# Patient Record
Sex: FEMALE | Race: WHITE | Employment: UNEMPLOYED | URBAN - METROPOLITAN AREA
[De-identification: names, ages, dates, MRNs, and addresses within clinical notes are randomized per-mention and may not be internally consistent; named-entity substitution may affect disease eponyms.]

---

## 2017-01-27 ENCOUNTER — HOSPITAL ENCOUNTER (OUTPATIENT)
Dept: MRI IMAGING | Age: 22
Discharge: HOME | End: 2017-01-27
Attending: SPECIALIST
Payer: COMMERCIAL

## 2017-01-27 DIAGNOSIS — R10.9 ABDOMINAL PAIN: ICD-10-CM

## 2017-01-27 DIAGNOSIS — R11.0 NAUSEA: ICD-10-CM

## 2017-01-27 DIAGNOSIS — R94.5 NONSPECIFIC ABNORMAL RESULTS OF LIVER FUNCTION STUDY: ICD-10-CM

## 2017-01-27 PROCEDURE — 74181 MRI ABDOMEN W/O CONTRAST: CPT

## 2018-01-05 ENCOUNTER — HOSPITAL ENCOUNTER (EMERGENCY)
Age: 23
Discharge: HOME | End: 2018-01-05
Attending: EMERGENCY MEDICINE
Payer: COMMERCIAL

## 2018-01-05 ENCOUNTER — APPOINTMENT (OUTPATIENT)
Dept: ULTRASOUND IMAGING | Age: 23
End: 2018-01-05
Attending: EMERGENCY MEDICINE
Payer: COMMERCIAL

## 2018-01-05 ENCOUNTER — APPOINTMENT (OUTPATIENT)
Dept: GENERAL RADIOLOGY | Age: 23
End: 2018-01-05
Attending: EMERGENCY MEDICINE
Payer: COMMERCIAL

## 2018-01-05 VITALS
BODY MASS INDEX: 22.11 KG/M2 | SYSTOLIC BLOOD PRESSURE: 119 MMHG | HEIGHT: 65 IN | TEMPERATURE: 97.5 F | HEART RATE: 101 BPM | WEIGHT: 132.72 LBS | OXYGEN SATURATION: 99 % | RESPIRATION RATE: 21 BRPM | DIASTOLIC BLOOD PRESSURE: 71 MMHG

## 2018-01-05 DIAGNOSIS — R09.1 PLEURISY: ICD-10-CM

## 2018-01-05 DIAGNOSIS — F10.920 ACUTE ALCOHOLIC INTOXICATION WITHOUT COMPLICATION (HCC): Primary | ICD-10-CM

## 2018-01-05 DIAGNOSIS — R79.89 ELEVATED LFTS: ICD-10-CM

## 2018-01-05 DIAGNOSIS — F14.10 COCAINE ABUSE (HCC): ICD-10-CM

## 2018-01-05 LAB
ALBUMIN SERPL-MCNC: 4.3 G/DL (ref 3.5–5)
ALBUMIN/GLOB SERPL: 1.2 {RATIO} (ref 1.1–2.2)
ALP SERPL-CCNC: 110 U/L (ref 45–117)
ALT SERPL-CCNC: 44 U/L (ref 12–78)
AMMONIA PLAS-SCNC: 14 UMOL/L
AMPHET UR QL SCN: NEGATIVE
ANION GAP SERPL CALC-SCNC: 12 MMOL/L (ref 5–15)
APPEARANCE UR: CLEAR
AST SERPL-CCNC: 32 U/L (ref 15–37)
ATRIAL RATE: 133 BPM
BARBITURATES UR QL SCN: NEGATIVE
BASOPHILS # BLD: 0.1 K/UL (ref 0–0.1)
BASOPHILS NFR BLD: 1 % (ref 0–1)
BENZODIAZ UR QL: NEGATIVE
BILIRUB SERPL-MCNC: 0.2 MG/DL (ref 0.2–1)
BILIRUB UR QL: NEGATIVE
BUN SERPL-MCNC: 7 MG/DL (ref 6–20)
BUN/CREAT SERPL: 9 (ref 12–20)
CALCIUM SERPL-MCNC: 9 MG/DL (ref 8.5–10.1)
CALCULATED P AXIS, ECG09: 76 DEGREES
CALCULATED R AXIS, ECG10: 10 DEGREES
CALCULATED T AXIS, ECG11: 58 DEGREES
CANNABINOIDS UR QL SCN: NEGATIVE
CHLORIDE SERPL-SCNC: 106 MMOL/L (ref 97–108)
CK MB CFR SERPL CALC: NORMAL % (ref 0–2.5)
CK MB SERPL-MCNC: <1 NG/ML (ref 5–25)
CK SERPL-CCNC: 84 U/L (ref 26–192)
CO2 SERPL-SCNC: 25 MMOL/L (ref 21–32)
COCAINE UR QL SCN: POSITIVE
COLOR UR: NORMAL
CREAT SERPL-MCNC: 0.76 MG/DL (ref 0.55–1.02)
D DIMER PPP FEU-MCNC: 0.25 MG/L FEU (ref 0–0.65)
DIAGNOSIS, 93000: NORMAL
DRUG SCRN COMMENT,DRGCM: ABNORMAL
EOSINOPHIL # BLD: 0.1 K/UL (ref 0–0.4)
EOSINOPHIL NFR BLD: 1 % (ref 0–7)
ERYTHROCYTE [DISTWIDTH] IN BLOOD BY AUTOMATED COUNT: 13.3 % (ref 11.5–14.5)
ETHANOL SERPL-MCNC: 185 MG/DL
GLOBULIN SER CALC-MCNC: 3.7 G/DL (ref 2–4)
GLUCOSE SERPL-MCNC: 97 MG/DL (ref 65–100)
GLUCOSE UR STRIP.AUTO-MCNC: NEGATIVE MG/DL
HCG UR QL: NEGATIVE
HCT VFR BLD AUTO: 43 % (ref 35–47)
HGB BLD-MCNC: 14.8 G/DL (ref 11.5–16)
HGB UR QL STRIP: NEGATIVE
KETONES UR QL STRIP.AUTO: NEGATIVE MG/DL
LEUKOCYTE ESTERASE UR QL STRIP.AUTO: NEGATIVE
LIPASE SERPL-CCNC: 84 U/L (ref 73–393)
LYMPHOCYTES # BLD: 2.1 K/UL (ref 0.8–3.5)
LYMPHOCYTES NFR BLD: 28 % (ref 12–49)
MCH RBC QN AUTO: 30.5 PG (ref 26–34)
MCHC RBC AUTO-ENTMCNC: 34.4 G/DL (ref 30–36.5)
MCV RBC AUTO: 88.5 FL (ref 80–99)
METHADONE UR QL: NEGATIVE
MONOCYTES # BLD: 0.7 K/UL (ref 0–1)
MONOCYTES NFR BLD: 9 % (ref 5–13)
NEUTS SEG # BLD: 4.7 K/UL (ref 1.8–8)
NEUTS SEG NFR BLD: 61 % (ref 32–75)
NITRITE UR QL STRIP.AUTO: NEGATIVE
OPIATES UR QL: NEGATIVE
P-R INTERVAL, ECG05: 132 MS
PCP UR QL: NEGATIVE
PH UR STRIP: 6.5 [PH] (ref 5–8)
PLATELET # BLD AUTO: 269 K/UL (ref 150–400)
POTASSIUM SERPL-SCNC: 3.3 MMOL/L (ref 3.5–5.1)
PROT SERPL-MCNC: 8 G/DL (ref 6.4–8.2)
PROT UR STRIP-MCNC: NEGATIVE MG/DL
Q-T INTERVAL, ECG07: 316 MS
QRS DURATION, ECG06: 78 MS
QTC CALCULATION (BEZET), ECG08: 470 MS
RBC # BLD AUTO: 4.86 M/UL (ref 3.8–5.2)
SODIUM SERPL-SCNC: 143 MMOL/L (ref 136–145)
SP GR UR REFRACTOMETRY: <1.005 (ref 1–1.03)
TROPONIN I SERPL-MCNC: <0.04 NG/ML
UR CULT HOLD, URHOLD: NORMAL
UROBILINOGEN UR QL STRIP.AUTO: 0.2 EU/DL (ref 0.2–1)
VENTRICULAR RATE, ECG03: 133 BPM
WBC # BLD AUTO: 7.6 K/UL (ref 3.6–11)

## 2018-01-05 PROCEDURE — 80053 COMPREHEN METABOLIC PANEL: CPT | Performed by: EMERGENCY MEDICINE

## 2018-01-05 PROCEDURE — 83690 ASSAY OF LIPASE: CPT | Performed by: EMERGENCY MEDICINE

## 2018-01-05 PROCEDURE — 82550 ASSAY OF CK (CPK): CPT | Performed by: EMERGENCY MEDICINE

## 2018-01-05 PROCEDURE — 74011250636 HC RX REV CODE- 250/636: Performed by: EMERGENCY MEDICINE

## 2018-01-05 PROCEDURE — 71046 X-RAY EXAM CHEST 2 VIEWS: CPT

## 2018-01-05 PROCEDURE — 80307 DRUG TEST PRSMV CHEM ANLYZR: CPT | Performed by: EMERGENCY MEDICINE

## 2018-01-05 PROCEDURE — 82553 CREATINE MB FRACTION: CPT | Performed by: EMERGENCY MEDICINE

## 2018-01-05 PROCEDURE — 76700 US EXAM ABDOM COMPLETE: CPT

## 2018-01-05 PROCEDURE — 85025 COMPLETE CBC W/AUTO DIFF WBC: CPT | Performed by: EMERGENCY MEDICINE

## 2018-01-05 PROCEDURE — 99284 EMERGENCY DEPT VISIT MOD MDM: CPT

## 2018-01-05 PROCEDURE — 93005 ELECTROCARDIOGRAM TRACING: CPT

## 2018-01-05 PROCEDURE — 81003 URINALYSIS AUTO W/O SCOPE: CPT | Performed by: EMERGENCY MEDICINE

## 2018-01-05 PROCEDURE — 96361 HYDRATE IV INFUSION ADD-ON: CPT

## 2018-01-05 PROCEDURE — 94762 N-INVAS EAR/PLS OXIMTRY CONT: CPT

## 2018-01-05 PROCEDURE — 85379 FIBRIN DEGRADATION QUANT: CPT | Performed by: EMERGENCY MEDICINE

## 2018-01-05 PROCEDURE — 36415 COLL VENOUS BLD VENIPUNCTURE: CPT | Performed by: EMERGENCY MEDICINE

## 2018-01-05 PROCEDURE — 81025 URINE PREGNANCY TEST: CPT

## 2018-01-05 PROCEDURE — 96374 THER/PROPH/DIAG INJ IV PUSH: CPT

## 2018-01-05 PROCEDURE — 82140 ASSAY OF AMMONIA: CPT | Performed by: EMERGENCY MEDICINE

## 2018-01-05 PROCEDURE — 84484 ASSAY OF TROPONIN QUANT: CPT | Performed by: EMERGENCY MEDICINE

## 2018-01-05 RX ORDER — KETOROLAC TROMETHAMINE 30 MG/ML
30 INJECTION, SOLUTION INTRAMUSCULAR; INTRAVENOUS
Status: COMPLETED | OUTPATIENT
Start: 2018-01-05 | End: 2018-01-05

## 2018-01-05 RX ORDER — FAMOTIDINE 20 MG/1
20 TABLET, FILM COATED ORAL 2 TIMES DAILY
Qty: 30 TAB | Refills: 0 | Status: SHIPPED | OUTPATIENT
Start: 2018-01-05 | End: 2020-01-30

## 2018-01-05 RX ORDER — NAPROXEN 500 MG/1
500 TABLET ORAL 2 TIMES DAILY WITH MEALS
Qty: 20 TAB | Refills: 0 | Status: SHIPPED | OUTPATIENT
Start: 2018-01-05 | End: 2020-01-30

## 2018-01-05 RX ADMIN — KETOROLAC TROMETHAMINE 30 MG: 30 INJECTION, SOLUTION INTRAMUSCULAR at 03:39

## 2018-01-05 RX ADMIN — SODIUM CHLORIDE 1000 ML: 9 INJECTION, SOLUTION INTRAVENOUS at 03:40

## 2018-01-05 NOTE — Clinical Note
Please avoid any further alcohol beverages until seen and evaluated by the family doctor and have further testing to ascertain that the liver enzymes are back to normal level.

## 2018-01-05 NOTE — DISCHARGE INSTRUCTIONS
We hope that we have addressed all of your medical concerns. The examination and treatment you received in the Emergency Department were for an emergent problem and were not intended as complete care. It is important that you follow up with your healthcare provider(s) for ongoing care. If your symptoms worsen or do not improve as expected, and you are unable to reach your usual health care provider(s), you should return to the Emergency Department. Today's healthcare is undergoing tremendous change, and patient satisfaction surveys are one of the many tools to assess the quality of medical care. You may receive a survey from the [facility] regarding your experience in the Emergency Department. I hope that your experience has been completely positive, particularly the medical care that I provided. As such, please participate in the survey; anything less than excellent does not meet my expectations or intentions. [facility] and [address] [place] [address] participate in nationally recognized quality of care measures. If your blood pressure is greater than 120/80, as reported below, we urge that you seek medical care to address the potential of high blood pressure, commonly known as hypertension. Hypertension can be hereditary or can be caused by certain medical conditions, pain, stress, or \"white coat syndrome. \"       Please make an appointment with your health care provider(s) for follow up of your Emergency Department visit. VITALS:   Patient Vitals for the past 8 hrs:   Temp Pulse Resp BP SpO2   01/05/18 0345 - 94 17 110/49 96 %   01/05/18 0227 97.5 °F (36.4 °C) (!) 127 24 147/81 100 %          Thank you for allowing us to provide you with medical care today. We realize that you have many choices for your emergency care needs. Please choose us in the future for any continued health care needs. Paco Lowery, MD    [address] [facility].   Office: [phone]            Recent Results (from the past 24 hour(s))   CBC WITH AUTOMATED DIFF    Collection Time: 01/05/18  2:53 AM   Result Value Ref Range    WBC 7.6 3.6 - 11.0 K/uL    RBC 4.86 3.80 - 5.20 M/uL    HGB 14.8 11.5 - 16.0 g/dL    HCT 43.0 35.0 - 47.0 %    MCV 88.5 80.0 - 99.0 FL    MCH 30.5 26.0 - 34.0 PG    MCHC 34.4 30.0 - 36.5 g/dL    RDW 13.3 11.5 - 14.5 %    PLATELET 338 565 - 938 K/uL    NEUTROPHILS 61 32 - 75 %    LYMPHOCYTES 28 12 - 49 %    MONOCYTES 9 5 - 13 %    EOSINOPHILS 1 0 - 7 %    BASOPHILS 1 0 - 1 %    ABS. NEUTROPHILS 4.7 1.8 - 8.0 K/UL    ABS. LYMPHOCYTES 2.1 0.8 - 3.5 K/UL    ABS. MONOCYTES 0.7 0.0 - 1.0 K/UL    ABS. EOSINOPHILS 0.1 0.0 - 0.4 K/UL    ABS. BASOPHILS 0.1 0.0 - 0.1 K/UL   METABOLIC PANEL, COMPREHENSIVE    Collection Time: 01/05/18  2:53 AM   Result Value Ref Range    Sodium 143 136 - 145 mmol/L    Potassium 3.3 (L) 3.5 - 5.1 mmol/L    Chloride 106 97 - 108 mmol/L    CO2 25 21 - 32 mmol/L    Anion gap 12 5 - 15 mmol/L    Glucose 97 65 - 100 mg/dL    BUN 7 6 - 20 MG/DL    Creatinine 0.76 0.55 - 1.02 MG/DL    BUN/Creatinine ratio 9 (L) 12 - 20      GFR est AA >60 >60 ml/min/1.73m2    GFR est non-AA >60 >60 ml/min/1.73m2    Calcium 9.0 8.5 - 10.1 MG/DL    Bilirubin, total 0.2 0.2 - 1.0 MG/DL    ALT (SGPT) 44 12 - 78 U/L    AST (SGOT) 32 15 - 37 U/L    Alk.  phosphatase 110 45 - 117 U/L    Protein, total 8.0 6.4 - 8.2 g/dL    Albumin 4.3 3.5 - 5.0 g/dL    Globulin 3.7 2.0 - 4.0 g/dL    A-G Ratio 1.2 1.1 - 2.2     CK W/ CKMB & INDEX    Collection Time: 01/05/18  2:53 AM   Result Value Ref Range    CK 84 26 - 192 U/L    CK - MB <1.0 <3.6 NG/ML    CK-MB Index Cannot be calculated 0 - 2.5     TROPONIN I    Collection Time: 01/05/18  2:53 AM   Result Value Ref Range    Troponin-I, Qt. <0.04 <0.05 ng/mL   D DIMER    Collection Time: 01/05/18  2:53 AM   Result Value Ref Range    D-dimer 0.25 0.00 - 0.65 mg/L FEU   LIPASE Collection Time: 01/05/18  2:53 AM   Result Value Ref Range    Lipase 84 73 - 393 U/L   ETHYL ALCOHOL    Collection Time: 01/05/18  2:54 AM   Result Value Ref Range    ALCOHOL(ETHYL),SERUM 185 (H) <10 MG/DL   AMMONIA    Collection Time: 01/05/18  3:03 AM   Result Value Ref Range    Ammonia 14 <32 UMOL/L   URINALYSIS W/ RFLX MICROSCOPIC    Collection Time: 01/05/18  3:12 AM   Result Value Ref Range    Color YELLOW/STRAW      Appearance CLEAR CLEAR      Specific gravity <1.005 1.003 - 1.030    pH (UA) 6.5 5.0 - 8.0      Protein NEGATIVE  NEG mg/dL    Glucose NEGATIVE  NEG mg/dL    Ketone NEGATIVE  NEG mg/dL    Bilirubin NEGATIVE  NEG      Blood NEGATIVE  NEG      Urobilinogen 0.2 0.2 - 1.0 EU/dL    Nitrites NEGATIVE  NEG      Leukocyte Esterase NEGATIVE  NEG     DRUG SCREEN, URINE    Collection Time: 01/05/18  3:12 AM   Result Value Ref Range    AMPHETAMINES NEGATIVE  NEG      BARBITURATES NEGATIVE  NEG      BENZODIAZEPINES NEGATIVE  NEG      COCAINE POSITIVE (A) NEG      METHADONE NEGATIVE  NEG      OPIATES NEGATIVE  NEG      PCP(PHENCYCLIDINE) NEGATIVE  NEG      THC (TH-CANNABINOL) NEGATIVE  NEG      Drug screen comment (NOTE)    URINE CULTURE HOLD SAMPLE    Collection Time: 01/05/18  3:12 AM   Result Value Ref Range    Urine culture hold URINE ON HOLD IN MICROBIOLOGY DEPT FOR 3 DAYS     HCG URINE, QL. - POC    Collection Time: 01/05/18  3:14 AM   Result Value Ref Range    Pregnancy test,urine (POC) NEGATIVE  NEG         Xr Chest Pa Lat    Result Date: 1/5/2018  INDICATION: Chest Pain EXAM: CXR 2 View FINDINGS: Frontal and lateral views of the chest show clear lungs. Heart size is normal. There is no pulmonary edema. There is no evident pneumothorax, adenopathy or effusion. IMPRESSION: Normal two view chest x-ray. Us Abd Comp    Result Date: 1/5/2018  EXAM:  US ABD COMP INDICATION: Epigastric abdominal pain.  TECHNIQUE: Real-time sonography of the abdomen was performed with multiple static images of the liver, gallbladder, pancreas, spleen, kidneys and retroperitoneum obtained. FINDINGS: LIVER: The liver is normal in echotexture with no mass or other focal abnormality. LIVER VASCULATURE: The portal vein flow is antegrade. GALLBLADDER: The gallbladder is normal and no stones are identified. There is no wall thickening or fluid around the gallbladder. COMMON BILE DUCT: There is no biliary duct dilatation and the common duct measures 3 mm in diameter. PANCREAS: The visualized pancreas is normal. SPLEEN: The spleen is normal in echotexture and size and measures 10.7 cm in length. RIGHT KIDNEY: The right kidney demonstrates normal echogenicity with no mass, stone or hydronephrosis. The right kidney measures 10.2 cm in length. LEFT KIDNEY: The left kidney demonstrates normal echogenicity with no mass, stone or hydronephrosis. The left kidney measures 10.1 cm in length. RETROPERITONEUM: The aorta tapers normally. The IVC is normal. There is no ascites. IMPRESSION: Normal abdominal ultrasound examination. Pleurisy: Care Instructions  Your Care Instructions  Pleurisy is inflammation of the tissue that lines the inside of the chest and covers the lungs (pleura). Pleurisy is often caused by an infection, usually a virus. It also can be caused by other health problems, such as pneumonia or lupus. Pleurisy can cause sharp chest pain that gets worse when you cough or take a deep breath. You may need more tests to find out what is causing your pleurisy. Treatment depends on the cause. Pleurisy may come and go for a few days, or it may continue if the cause has not been treated. Home treatment can help ease symptoms. Follow-up care is a key part of your treatment and safety. Be sure to make and go to all appointments, and call your doctor if you are having problems. It's also a good idea to know your test results and keep a list of the medicines you take. How can you care for yourself at home?   · Take an over-the-counter pain medicine, such as acetaminophen (Tylenol), ibuprofen (Advil, Motrin), or naproxen (Aleve). Read and follow all instructions on the label. · Do not take two or more pain medicines at the same time unless the doctor told you to. Many pain medicines have acetaminophen, which is Tylenol. Too much acetaminophen (Tylenol) can be harmful. · If your doctor prescribed antibiotics, take them as directed. Do not stop taking them just because you feel better. You need to take the full course of antibiotics. · Take cough medicine as directed if your doctor recommends it. · Avoid activities that make the pain worse. When should you call for help? Call 911 anytime you think you may need emergency care. For example, call if:  ? · You have severe trouble breathing. ? · You have severe chest pain. ? · You passed out (lost consciousness). ?Call your doctor now or seek immediate medical care if:  ? · You have a new or higher fever. ? Watch closely for changes in your health, and be sure to contact your doctor if:  ? · You begin to cough up yellow or green mucus. ? · You cough up blood. ? · Your symptoms are not better in 3 or 4 days. Where can you learn more? Go to http://ghada-farrukh.info/. Enter F346 in the search box to learn more about \"Pleurisy: Care Instructions. \"  Current as of: May 12, 2017  Content Version: 11.4  © 2931-0108 [employer]. Care instructions adapted under license by [employer] (which disclaims liability or warranty for this information). If you have questions about a medical condition or this instruction, always ask your healthcare professional. [place] [address] any warranty or liability for your use of this information. Alcohol, Drug, or Poison Ingestion: Care Instructions  Your Care Instructions    A person can become very sick, or die, from swallowing or using alcohol, drugs, or poisons.   Alcohol poisoning occurs when a person drinks a large amount of alcohol. Alcohol can stop nerve signals that control breathing. It can also stop the gag reflex that prevents choking. Alcohol poisoning is serious. It can lead to brain damage or death if it's not treated right away. Drugs can be used by accident or on purpose. They can be swallowed, inhaled, injected, or absorbed through the skin. Drugs include over-the-counter medicine (such as aspirin or acetaminophen) and prescription medicine. They also include vitamins and supplements. And they include illegal drugs such as cocaine and heroin. And poisons are all around us. They include household , cosmetics, houseplants, and garden chemicals. The doctor has checked you carefully, but problems can develop later. If you notice any problems or new symptoms, get medical treatment right away. Follow-up care is a key part of your treatment and safety. Be sure to make and go to all appointments, and call your doctor if you are having problems. It's also a good idea to know your test results and keep a list of the medicines you take. How can you care for yourself at home? Alcohol problems  · Talk to your doctor or counselor about programs that can help you stop using alcohol. · Plan ways to avoid being tempted to drink. ¨ Get rid of all alcohol in your home. ¨ Avoid places where you tend to drink. ¨ Stay away from places or events that offer alcohol. ¨ Stay away from people who drink a lot. Drug problems  · Talk to your doctor about programs that can help you stop using drugs. · Get rid of any drugs you might be tempted to misuse. · Learn how to say no when other people use drugs. · Don't spend time with people who use drugs. Poison prevention  · Keep products in the containers they came in. Keep them with the original labels. · Be careful when you use cleaning products, paints, solvents, and pesticides. Read labels before use.  Use a fan to move strong odors and fumes out of your home. · Do not mix cleaning products. Try to use nontoxic . These include vinegar, lemon juice, and baking soda. When should you call for help? Poison control centers, hospitals, or your doctor can give immediate advice in the case of a poisoning. The [employer] number is [phone]. Have the poison container with you so you can give complete information to the poison control center, such as what the poison or substance is, how much was taken and when. Do not try to make the person vomit. ?Call 911 anytime you think you may need emergency care. For example, call if you or someone else:  ? · Has used or currently uses alcohol or drugs and is very confused or can't stay awake. ? · Has passed out (lost consciousness). ? · Has severe trouble breathing. ? · Is having a seizure. ?Call your doctor now or seek immediate medical care if you or someone else:  ? · Has new symptoms, or is not acting normally. ? Watch closely for changes in your health, and be sure to contact your doctor if:  ? · You do not get better as expected. ? · You need help with drug or alcohol problems. ? · You have problems with depression or other mental health issues. Where can you learn more? Go to http://ghada-farrukh.info/. Enter V370 in the search box to learn more about \"Alcohol, Drug, or Poison Ingestion: Care Instructions. \"  Current as of: March 20, 2017  Content Version: 11.4  © 3814-5629 [employer]. Care instructions adapted under license by [employer] (which disclaims liability or warranty for this information). If you have questions about a medical condition or this instruction, always ask your healthcare professional. [address] any warranty or liability for your use of this information.

## 2018-01-05 NOTE — ED TRIAGE NOTES
Patient with chest/rib pain and having a hard time taking a deep breath that started about 30 minutes ago. States has been drinking tonight. Was told by her physician that her liver enzymes were elevated, that she was in kidney failure and that she has mono.

## 2018-01-05 NOTE — ED PROVIDER NOTES
HPI Comments: 25 y.o. female with past medical history significant for anemia, scoliosis, s/p cholecystectomy, who presents ambulatory to the ED with chief complaint of CP/epigastric abdominal pain. Pt reports that her pain started ~30 minutes ago while at the bar. The pain is \"sharp\" in quality and exacerbated with deep inspiration. It starts in the center of the chest and radiates into the epigastrium and around the ribcage. She also c/o pain in the mid to lower back. There was no injury or trauma associated with the onset of her discomfort, and she denies any h/o similar symptoms. She additionally c/o some nausea at this time. Of note, pt states that she had blood work performed at her PCP's office on 12/22/2017 because she has had a recent cough and has been fatigued/sleeping more. She reports that her PCP's office called her tonight and told her that her LFTs were elevated, and also told her that she tested positive for mono. Admits that she was drinking at the bar tonight, and reports ingestion of three alcoholic beverages (two beers and one shooter). Pt specifically denies any congestion, fevers, HA, diarrhea, constipation, and vomiting. Notes her LMP was 2 weeks ago and normal. Her boyfriend drove her to the ED. There are no other acute medical concerns at this time. Social hx: Negative for Tobacco use (patient is a former smoker); Positive for EtOH use (occasional); Negative for Illicit Drug Abuse    PCP: Юлия Munoz MD    Note written by Delphine Mcdaniels. Marguerite Benz, as dictated by Deann Granda MD 2:40 AM     The history is provided by the patient. The history is limited by the condition of the patient (alcohol intoxication). No  was used.         Past Medical History:   Diagnosis Date    Anemia     Pyelonephritis, unspecified 6/24/2012    Routine Papanicolaou smear 9/1/16 neg    Scoliosis     scoliosis       Past Surgical History:   Procedure Laterality Date    HX CHOLECYSTECTOMY  04/2016    HX OTHER SURGICAL  08/2016    Ureteroscopy         Family History:   Problem Relation Age of Onset    Asthma Mother     Diabetes Father        Social History     Social History    Marital status: SINGLE     Spouse name: N/A    Number of children: N/A    Years of education: N/A     Occupational History    Not on file. Social History Main Topics    Smoking status: Former Smoker    Smokeless tobacco: Never Used    Alcohol use No    Drug use: No    Sexual activity: Yes     Partners: Male     Birth control/ protection: Pill     Other Topics Concern    Not on file     Social History Narrative         ALLERGIES: Review of patient's allergies indicates no known allergies. Review of Systems   Constitutional: Positive for fatigue. Negative for fever. HENT: Negative for congestion. Respiratory: Positive for cough. Cardiovascular: Positive for chest pain. Gastrointestinal: Positive for abdominal pain and nausea. Negative for constipation, diarrhea and vomiting. Genitourinary: Negative for menstrual problem. Musculoskeletal: Positive for back pain. Positive for rib pain   Neurological: Negative for headaches. All other systems reviewed and are negative. Vitals:    01/05/18 0227   BP: 147/81   Pulse: (!) 127   Resp: 24   Temp: 97.5 °F (36.4 °C)   SpO2: 100%   Weight: 60.2 kg (132 lb 11.5 oz)   Height: 5' 5\" (1.651 m)            Physical Exam   Nursing note and vitals reviewed. CONSTITUTIONAL: Well-appearing; well-nourished; in mild distress  HEAD: Normocephalic; atraumatic  EYES: PERRL; EOM intact; conjunctiva and sclera are clear bilaterally. ENT: No rhinorrhea; normal pharynx with no tonsillar hypertrophy; mucous membranes pink/moist, no erythema, no exudate. NECK: Supple; non-tender; no cervical lymphadenopathy  CARD: Normal S1, S2; no murmurs, rubs, or gallops. Regular rate and rhythm.   RESP: Normal respiratory effort; breath sounds clear and equal bilaterally; no wheezes, rhonchi, or rales. ABD: Normal bowel sounds; non-distended; epigastric tenderness; no palpable organomegaly, no masses, no bruits. Back Exam: Normal inspection; no vertebral point tenderness, no CVA tenderness. Normal range of motion. EXT: Normal ROM in all four extremities; non-tender to palpation; no swelling or deformity; distal pulses are normal, no edema. SKIN: Warm; dry; no rash. NEURO:Alert and oriented x 3, coherent, MIREILLE-XII grossly intact, sensory and motor are non-focal.        MDM  Number of Diagnoses or Management Options  Diagnosis management comments: Assessment: this is a 72-year-old female with history ofrecent diagnosis of mono presents the ED with a complaint of midepigastric, and pleuritic chest pain that began this evening prior to arrival to the ED. The patient is in moderate distress and intoxicated, but hemodynamically stable. Differential diagnosis include pleurisy/gastritis/pancreatitis/biliary colic/ ACS/ VTE/     Plan: EKG/ lab/ IV fluid/ antiemetics and analgesia/ chest x-ray/ ultrasound. Abdomen/ serial exam/ Monitor and Reevaluate.          Amount and/or Complexity of Data Reviewed  Clinical lab tests: ordered and reviewed  Tests in the radiology section of CPT®: ordered and reviewed  Tests in the medicine section of CPT®: ordered and reviewed  Discussion of test results with the performing providers: yes  Decide to obtain previous medical records or to obtain history from someone other than the patient: yes  Obtain history from someone other than the patient: yes  Review and summarize past medical records: yes  Discuss the patient with other providers: yes  Independent visualization of images, tracings, or specimens: yes    Risk of Complications, Morbidity, and/or Mortality  Presenting problems: moderate  Diagnostic procedures: moderate  Management options: moderate    Patient Progress  Patient progress: stable    ED Course       Procedures     ED EKG interpretation:  Rhythm: sinus tachycardia; and regular . Rate (approx.): 133; Axis: normal; P wave: normal; QRS interval: normal ; ST/T wave: non-specific changes; in  Lead: diffusely; PVCs; Other findings: abnormal ekg. This EKG was interpreted by Radha Florence MD,ED Provider. XRAY INTERPRETATION (ED MD)  Chest Xray  No acute process seen. Normal heart size. No bony abnormalities. No infiltrate. Radha Florence MD 3:44 AM    Progress Note:   Pt has been reexamined by Radha Florence MD. Pt is feeling much better. Symptoms have improved. She is moderately intoxicated, but denies any further symptoms All available results have been reviewed with pt and any available family. Pt understands sx, dx, and tx in ED. Care plan has been outlined and questions have been answered. Pt is ready to go home. Will send home on pleurisy/GERD/alcohol and cocaine abuse instructions. Prescription Naprosyn and Pepcid. Outpatient referral with PCP/ GI as needed. Written by Radha Florence MD,5:16 AM    .   .

## 2018-01-16 ENCOUNTER — OFFICE VISIT (OUTPATIENT)
Dept: OBGYN CLINIC | Age: 23
End: 2018-01-16

## 2018-01-16 VITALS
BODY MASS INDEX: 21.99 KG/M2 | WEIGHT: 132 LBS | DIASTOLIC BLOOD PRESSURE: 81 MMHG | SYSTOLIC BLOOD PRESSURE: 119 MMHG | HEIGHT: 65 IN

## 2018-01-16 DIAGNOSIS — O20.0 THREATENED MISCARRIAGE: Primary | ICD-10-CM

## 2018-01-16 NOTE — PROGRESS NOTES
Threatened AB note    Lianna Harris is a ,  25 y.o. female [facility] No LMP recorded (lmp unknown). Patient is pregnant. making her possible around 7 weeks pregnant. She has not had prenatal care. Was on OCP til December. She presents with spotting that occurred off and on for the month of December . The amount of bleeding is described as light and not lasting long. She had a positive pregnancy test a few days ago. She has not had a recent pelvic ultrasound. Her past medical history is not significant for risk factors for ectopic pregnancy. She has not had pelvic pain. The patient specifically denies right or left pelvic pain. She does not have a history of a spontaneous . She has not had a recent injury or trauma. Additional complaints: none. Past Medical History:   Diagnosis Date    Anemia     Pyelonephritis, unspecified 2012    Routine Papanicolaou smear 16 neg    Scoliosis     scoliosis     Past Surgical History:   Procedure Laterality Date    HX CHOLECYSTECTOMY  2016   Arthea Lower OTHER SURGICAL  2016    Ureteroscopy     Social History     Occupational History    Not on file. Social History Main Topics    Smoking status: Former Smoker    Smokeless tobacco: Never Used    Alcohol use No    Drug use: No    Sexual activity: Yes     Partners: Male     Birth control/ protection: Pill     Family History   Problem Relation Age of Onset    Asthma Mother     Diabetes Father        No Known Allergies  Prior to Admission medications    Medication Sig Start Date End Date Taking? Authorizing Provider   naproxen (NAPROSYN) 500 mg tablet Take 1 Tab by mouth two (2) times daily (with meals).  18   Salomon Preciado MD   famotidine (PEPCID) 20 mg tablet Take 1 Tab by mouth two (2) times a day. 1/5/18   Radames Gonzalez MD   norgestimate-ethinyl estradiol ([id number],) 0.25-35 mg-mcg tab Take 1 Tab by mouth daily.  9/1/16   Memo Ramirez MD        Review of Systems: History obtained from the patient  Constitutional: negative for weight loss, fever, night sweats  Breast: negative for breast lumps, nipple discharge, galactorrhea  GI: negative for change in bowel habits, abdominal pain, black or bloody stools  : negative for frequency, dysuria, hematuria, vaginal discharge  MSK: negative for back pain, joint pain, muscle pain  Skin: negative for itching, rash, hives  Psych: negative for anxiety, depression, change in mood      Objective:  Visit Vitals    /81    Ht 5' 5\" (1.651 m)    Wt 132 lb (59.9 kg)    LMP  (LMP Unknown)    BMI 21.97 kg/m2       Physical Exam:   PHYSICAL EXAMINATION    Constitutional  · Appearance: well-nourished, well developed, alert, in no acute distress    Gastrointestinal  · Abdominal Examination: abdomen non-tender to palpation, normal bowel sounds, no masses present  · Liver and spleen: no hepatomegaly present, spleen not palpable  · Hernias: no hernias identified    Genitourinary  · External Genitalia: normal appearance for age, no discharge present, no tenderness present, no inflammatory lesions present, no masses present, no atrophy present  · Vagina: normal vaginal vault without central or paravaginal defects, bloody discharge present, no inflammatory lesions present, no masses present  · Bladder: non-tender to palpation  · Urethra: appears normal  · Cervix: normal   · Uterus: retroflexed, not enlarged, firm, not tender with normal shape and consistency  · Adnexa: no adnexal tenderness present, no adnexal masses present  · Perineum: perineum within normal limits, no evidence of trauma, no rashes or skin lesions present  · Anus: anus within normal limits, no hemorrhoids present  · Inguinal Lymph Nodes: no lymphadenopathy present    Skin  · General Inspection: no rash, no lesions identified    Neurologic/Psychiatric  · Mental Status:  · Orientation: grossly oriented to person, place and time  · Mood and Affect: mood normal, affect appropriate    Assessment:   Threatened AB vs Chemical pregnancy  US--normal non pregnant uterus    Plan:     Quantitative HCG today. Repeat prn  Transvaginal First Trimester Ultrasound Procedure    Michelle Max is a ,  25 y.o. female [facility] No LMP recorded (lmp unknown). This makes her currently 7 weeks and 0 days of gestation by dates. She is here today for early pregnancy ultrasound for suspected non-viability. Ultrasound results: A non-pregnant appearing uterus is seen. The normal endometrium is seen. The uterus is normal size.    Subchorionic hemorrhage was not seen  Right Ovary - NORMAL   Left Ovary - NORMAL but with multiple 3 mm cysts  Comment: no evidence of pregnancy

## 2018-01-17 LAB — HCG INTACT+B SERPL-ACNC: 15 MIU/ML

## 2018-01-18 ENCOUNTER — TELEPHONE (OUTPATIENT)
Dept: OBGYN CLINIC | Age: 23
End: 2018-01-18

## 2018-01-18 NOTE — TELEPHONE ENCOUNTER
Patient was advised of results from Beta HCG on 1/16/18 and had a very expressive reaction. As soon as I advised that the HCG nearly back to normal, her voice was raised loudly and would not allow me to speak. I advised that I had more advice to read. She then stopped and allowed me to continue. She is extremely upset and wants to speak with Dr. Watson Page. She said that she had a very positive pregnancy test today and two yesterday. Insists on message to be given to Dr. Watson Page to call her today.     Thanks

## 2018-04-11 ENCOUNTER — TELEPHONE (OUTPATIENT)
Dept: OBGYN CLINIC | Age: 23
End: 2018-04-11

## 2018-04-11 RX ORDER — NORGESTIMATE AND ETHINYL ESTRADIOL 0.25-0.035
KIT ORAL
Qty: 3 DOSE PACK | Refills: 0 | Status: SHIPPED | OUTPATIENT
Start: 2018-04-11 | End: 2018-09-08 | Stop reason: SDUPTHER

## 2018-04-11 NOTE — TELEPHONE ENCOUNTER
Message left at 3:15PM      25year old patient last seen in the office on 9/1/16. Patient left a message to say that she needs her birth control to be sent in 3 month supplies due to insurance. This nurse called the patient and advised that she is in need of AE. Patient stated \" I have been seen in Jan\". This nurse advised per Jennifer Hagan that patient needs AE and then prescription can be sent for 3 month supply. Patient placed on the schedule to be seen at 4/24/18 at 11:10am.    Prescription resent as per MD order for 3 months to patient preferred pharmacy. Patient verbalized understanding.

## 2019-09-12 ENCOUNTER — TELEPHONE (OUTPATIENT)
Dept: OBGYN CLINIC | Age: 24
End: 2019-09-12

## 2019-09-12 NOTE — TELEPHONE ENCOUNTER
Patient of [address] [place] [address] Dr Hoover. She is coming in 9/26/19 for EOB. She is approximately 6 weeks gestation. She started with slight pink vaginal bleeding randomly with wiping yesterday and it gradually darkened to a dark red. Today she is wearing a tampon and it has some dark red blood again. Some slight right sided abdominal pain but very minor. Good water intake. No recent intercourse. She has been sick with possible pneumonia and took inhaler, antibiotic, cough suppressant and oral steroid for the past 3 weeks. Please advise. The patient wants to be seen today. She said in the past she had a chemical pregnancy and is VERY anxious.

## 2019-09-12 NOTE — TELEPHONE ENCOUNTER
11:30 tomorrow with HW per [facility]- discussed with patient bleeding and pain precaution.

## 2019-09-13 ENCOUNTER — OFFICE VISIT (OUTPATIENT)
Dept: OBGYN CLINIC | Age: 24
End: 2019-09-13

## 2019-09-13 VITALS
HEIGHT: 65 IN | BODY MASS INDEX: 28.09 KG/M2 | SYSTOLIC BLOOD PRESSURE: 122 MMHG | DIASTOLIC BLOOD PRESSURE: 80 MMHG | WEIGHT: 168.6 LBS

## 2019-09-13 DIAGNOSIS — O20.0 THREATENED MISCARRIAGE: Primary | ICD-10-CM

## 2019-09-13 NOTE — PROGRESS NOTES
Threatened AB note    Arlene Bo is a ,  25 y.o. female [facility] No LMP recorded (lmp unknown). She states it was the end of July- making her about 6 weeks pregnant. She has not had prenatal care. She presents with spotting and cramping that started 1 day ago. The amount of bleeding is described as light. The cramps are described as minimal. She has not passed tissue. She had a positive pregnancy test on Tuesday. She has not had a recent pelvic ultrasound. Her past medical history is not significant for risk factors for ectopic pregnancy. She has not had pelvic pain. The patient specifically denies right or left pelvic pain. She does not have a history of a spontaneous  but has a history of a \"chemical\" pregnancy. She has not had a recent injury or trauma. Additional complaints: none. Past Medical History:   Diagnosis Date    Anemia     Pyelonephritis, unspecified 2012    Routine Papanicolaou smear 16 neg    Scoliosis     scoliosis     Past Surgical History:   Procedure Laterality Date    HX CHOLECYSTECTOMY  2016    HX OTHER SURGICAL  2016    Ureteroscopy     Social History     Occupational History    Not on file   Tobacco Use    Smoking status: Former Smoker    Smokeless tobacco: Never Used   Substance and Sexual Activity    Alcohol use: No    Drug use: No    Sexual activity: Yes     Partners: Male     Birth control/protection: None     Family History   Problem Relation Age of Onset    Asthma Mother     Diabetes Father        No Known Allergies  Prior to Admission medications    Medication Sig Start Date End Date Taking?  Authorizing Provider   [facility], , 0.25-35 mg-mcg tab TAKE 1 TABLET BY MOUTH ONE TIME DAILY 9/10/18   Luisa Duenas MD   [address] [facility], , 0.25-35 mg-mcg tab TAKE 1 TABLET BY MOUTH ONE TIME DAILY 9/8/18   Luisa Duenas MD   naproxen (NAPROSYN) 500 mg tablet Take 1 Tab by mouth two (2) times daily (with meals). 1/5/18   Lolis Marin MD   famotidine (PEPCID) 20 mg tablet Take 1 Tab by mouth two (2) times a day. 1/5/18   Lolis Marin MD        Review of Systems: History obtained from the patient  Constitutional: negative for weight loss, fever, night sweats  Breast: negative for breast lumps, nipple discharge, galactorrhea  GI: negative for change in bowel habits, abdominal pain, black or bloody stools  : negative for frequency, dysuria, hematuria, vaginal discharge  MSK: negative for back pain, joint pain, muscle pain  Skin: negative for itching, rash, hives  Psych: negative for anxiety, depression, change in mood      Objective:  Visit Vitals  /80   Ht 5' 5\" (1.651 m)   Wt 168 lb 9.6 oz (76.5 kg)   LMP  (LMP Unknown)   Breastfeeding?  No   BMI 28.06 kg/m²       Physical Exam:   PHYSICAL EXAMINATION    Constitutional  · Appearance: well-nourished, well developed, alert, in no acute distress    Gastrointestinal  · Abdominal Examination: abdomen non-tender to palpation, normal bowel sounds, no masses present  · Liver and spleen: no hepatomegaly present, spleen not palpable  · Hernias: no hernias identified    Genitourinary  · External Genitalia: normal appearance for age, no discharge present, no tenderness present, no inflammatory lesions present, no masses present, no atrophy present  · Vagina: normal vaginal vault without central or paravaginal defects, bloody discharge present, no inflammatory lesions present, no masses present  · Bladder: non-tender to palpation  · Urethra: appears normal  · Cervix: normal   · Uterus: not enlarged, not soft, not tender with normal shape and consistency--retroflexed  · Adnexa: no adnexal tenderness present, no adnexal masses present  · Perineum: perineum within normal limits, no evidence of trauma, no rashes or skin lesions present  · Anus: anus within normal limits, no hemorrhoids present  · Inguinal Lymph Nodes: no lymphadenopathy present    Skin  · General Inspection: no rash, no lesions identified    Neurologic/Psychiatric  · Mental Status:  · Orientation: grossly oriented to person, place and time  · Mood and Affect: mood normal, affect appropriate    Assessment:   US c/w threatened AB  Suspect chemical preg    Plan:   Quantitative HCG's  RTO: Monday for repeat    Transvaginal First Trimester Ultrasound Procedure    Brett Ellis is a G2 ,  25 y.o. female [facility]  No LMP recorded (lmp unknown). This makes her currently 6 weeks and 0 days of gestation by dates. She is here today for early pregnancy ultrasound for suspected non-viability. Ultrasound results: no intrauterine pregnancy is visible. The fetus is not seen. The yolk sac is not seen. The crown rump length of the fetus is not measurable. Subchorionic hemorrhage was not seen  Right Ovary - NORMAL   Left Ovary - CL cyst  Comment: non-viable IUP is suspected. Endometrium is somewhat thickened at 8 mm.   Small amount of fluid in CDS

## 2019-09-14 LAB — HCG INTACT+B SERPL-ACNC: 175 MIU/ML

## 2019-09-16 ENCOUNTER — LAB ONLY (OUTPATIENT)
Dept: OBGYN CLINIC | Age: 24
End: 2019-09-16

## 2019-09-16 DIAGNOSIS — O20.0 THREATENED MISCARRIAGE: Primary | ICD-10-CM

## 2019-09-17 LAB — HCG INTACT+B SERPL-ACNC: 28 MIU/ML

## 2019-11-21 NOTE — PROGRESS NOTES
Current pregnancy history:    Aminah Torres is a 25 y.o. female who presents for the evaluation of pregnancy. Patient's last menstrual period was 10/01/2019 (exact date). LMP history:  The date of her LMP is  certain. Her last menstrual period was normal and lasted for 4 to 5 days. A urine pregnancy test was positive 3 1/2 weeks weeks ago. She was not on the pill at conception. Based on her LMP, her EDC is 20 and her EGA is 7 weeks, 6 days. Her menstrual cycles are regular and occur approximately every 28 days  and range from 3 to 5 days. The last menses lasted  the usual number of days. Ultrasound data:  She had an  ultrasound done by the physician today which revealed a viable funez pregnancy with a gestational age of 7 weeks and 6 days giving an [address] of 20. Pregnancy symptoms:    Since her LMP she has experienced  urinary frequency, breast tenderness, and nausea. She has not been vomiting over the last few weeks. Associated signs and symptoms which she denies: dysuria, discharge, vaginal bleeding. She states she has gained weight:  Approximately a few pounds over the last few weeks. Relevant past pregnancy history:   She has the following pregnancy history: SAB Her last pregnancy was uncomplicated. She has no history of  delivery. Relevant past medical history:(relevant to this pregnancy): noncontributory. Pap/Occupational history:  Last pap smear: 3 years ago Results: Normal      Her occupation is: Employed. Substance history: negative for alcohol, tobacco and street drugs. Positive for nothing. Exposure history: There is/are no indoor cat/s in the home. The patient was instructed to not change the cat litter. She admits close contact with children on a regular basis. She has had chicken pox or the vaccine in the past.   Patient denies issues with domestic violence.      Genetic Screening/Teratology Counseling: (Includes patient, baby's father, or anyone in either family with:)  1.  Patient's age >/= 28 [facility]?-- no  .   2. Thalassemia ([facility] [place], [address], or  background): MCV<80?--no.     3.  Neural tube defect (meningomyelocele, spina bifida, anencephaly)?--no.   4.  Congenital heart defect?--no.  5.  Down syndrome?--no.   6.  Luis-Sachs (Anglican, Western [place])?--no.   7.  Canavan's Disease?--no.   8.  Familial Dysautonomia?--no.   9.  Sickle cell disease or trait ()? --no   The patient has not been tested for sickle trait  10. Hemophilia or other blood disorders?--no. 11.  Muscular dystrophy?--no. 12.  Cystic fibrosis?--no. 13.  [place]'s Chorea?--no. 14.  Mental retardation/autism (if yes was person tested for Fragile X)?--no. 15.  Other inherited genetic or chromosomal disorder?--no. 12.  Maternal metabolic disorder (DM, PKU, etc)?--no. 17.  Patient or FOB with a child with a birth defect not listed above?--no.  17a. Patient or FOB with a birth defect themselves?--no. 18.  Recurrent pregnancy loss, or stillbirth?--no. 19.  Any medications since LMP other than prenatal vitamins (include vitamins, supplements, OTC meds, drugs, alcohol)?--no. 20.  Any other genetic/environmental exposure to discuss?--no. Infection History:  1. Lives with someone with TB or TB exposed?--no.   2.  Patient or partner has history of genital herpes?--no.  3.  Rash or viral illness since LMP?--no.    4.  History of STD (GC, CT, HPV, syphilis, HIV)? --no   5. Other: OTHER?       Past Medical History:   Diagnosis Date    Anemia     Pyelonephritis, unspecified 6/24/2012    Routine Papanicolaou smear 9/1/16 neg    Scoliosis     scoliosis     Past Surgical History:   Procedure Laterality Date    HX CHOLECYSTECTOMY  04/2016    HX OTHER SURGICAL  08/2016    Ureteroscopy     Social History     Occupational History    Not on file   Tobacco Use    Smoking status: Former Smoker    Smokeless tobacco: Never Used Substance and Sexual Activity    Alcohol use: No    Drug use: No    Sexual activity: Yes     Partners: Male     Birth control/protection: None     Family History   Problem Relation Age of Onset    Asthma Mother     Diabetes Father        No Known Allergies  Prior to Admission medications    Medication Sig Start Date End Date Taking? Authorizing Provider   [facility]3 [facility], 28, 1.07-31 mg-mcg tab TAKE 1 TABLET BY MOUTH ONE TIME DAILY 9/10/18   Vinicio Junior MD   [address] [facility], 28, 0.25-35 mg-mcg tab TAKE 1 TABLET BY MOUTH ONE TIME DAILY 9/8/18   Vinicio Junior MD   naproxen (NAPROSYN) 500 mg tablet Take 1 Tab by mouth two (2) times daily (with meals). 1/5/18   Wendy Mclean MD   famotidine (PEPCID) 20 mg tablet Take 1 Tab by mouth two (2) times a day.  1/5/18   Wendy Mclean MD        Review of Systems: History obtained from the patient  Constitutional: negative for weight loss, fever, night sweats  HEENT: negative for hearing loss, earache, congestion, snoring, sorethroat  CV: negative for chest pain, palpitations, edema  Resp: negative for cough, shortness of breath, wheezing  Breast: negative for breast lumps, nipple discharge, galactorrhea  GI: negative for change in bowel habits, abdominal pain, black or bloody stools  : negative for frequency, dysuria, hematuria, vaginal discharge  MSK: negative for back pain, joint pain, muscle pain  Skin: negative for itching, rash, hives  Neuro: negative for dizziness, headache, confusion, weakness  Psych: negative for anxiety, depression, change in mood  Heme/lymph: negative for bleeding, bruising, pallor    Objective:  Visit Vitals  /64   Ht 5' 5\" (1.651 m)   Wt 178 lb (80.7 kg)   LMP 10/01/2019 (Exact Date)   BMI 29.62 kg/m²       Physical Exam:   PHYSICAL EXAMINATION    Constitutional  · Appearance: well-nourished, well developed, alert, in no acute distress    HENT  · Head  · Face: appears normal  · Eyes: appear normal  · Ears: normal  · Mouth: normal  · Lips: no lesions    Neck  · Inspection/Palpation: normal appearance, no masses or tenderness  · Lymph Nodes: no lymphadenopathy present  · Thyroid: gland size normal, nontender, no nodules or masses present on palpation    Chest  · Respiratory Effort: breathing unlabored  · Auscultation: normal breath sounds    Cardiovascular  · Heart:  · Auscultation: regular rate and rhythm without murmur    Breasts  · Inspection of Breasts: breasts symmetrical, no skin changes, no discharge present, nipple appearance normal, no skin retraction present  · Palpation of Breasts and Axillae: no masses present on palpation, no breast tenderness  · Axillary Lymph Nodes: no lymphadenopathy present    Gastrointestinal  · Abdominal Examination: abdomen non-tender to palpation, normal bowel sounds, no masses present  · Liver and spleen: no hepatomegaly present, spleen not palpable  · Hernias: no hernias identified    Genitourinary  · External Genitalia: normal appearance for age, no discharge present, no tenderness present, no inflammatory lesions present, no masses present, no atrophy present  · Vagina: normal vaginal vault without central or paravaginal defects, no discharge present, no inflammatory lesions present, no masses present  · Bladder: non-tender to palpation  · Urethra: appears normal  · Cervix: normal   · Uterus: enlarged, normal shape, soft  · Adnexa: no adnexal tenderness present, no adnexal masses present  · Perineum: perineum within normal limits, no evidence of trauma, no rashes or skin lesions present  · Anus: anus within normal limits, no hemorrhoids present  · Inguinal Lymph Nodes: no lymphadenopathy present    Skin  · General Inspection: no rash, no lesions identified    Neurologic/Psychiatric  · Mental Status:  · Orientation: grossly oriented to person, place and time  · Mood and Affect: mood normal, affect appropriate    Assessment:   Intrauterine pregnancy with the following problems identified: none.         Plan: Offered CF testing, CVS, Nuchal Translucency, MSAFP, amnio, and discussed NIPT  Course of pregnancy discussed including visit schedule, routine U/S, glucola testing, etc.  Avoid alcoholic beverages and illicit/recreational drugs use  Take prenatal vitamins or folic acid daily. Hospital and practice style discussed with coverage system. Discussed nutrition, toxoplasmosis precautions, sexual activity, exercise, need for influenza vaccine, environmental and work hazards, travel advice, screen for domestic violence, need for seat belts. Discussed seafood, unpasteurized dairy products, deli meat, artificial sweeteners, and caffeine. Information on prenatal classes/breastfeeding given. Information on circumcision given  Patient encouraged not to smoke. Discussed current prescription drug use. Given medication list.  Discussed the use of over the counter medications and chemicals. Route of delivery discussed, including risks, benefits, and alternatives of  versus repeat LTCS. Pt understands risk of hemorrhage during pregnancy and post delivery and would accept blood products if necessary in life-threatening emergencies      Handouts given to pt. Transvaginal First Trimester Ultrasound Procedure    Arnav Pierce is a G3 ,  25 y.o. female [facility] Patient's last menstrual period was 10/01/2019 (exact date). This makes her currently 7 weeks and 6 days of gestation by dates. She is here today for early pregnancy ultrasound for pregnancy dating. Ultrasound results:   A funez intrauterine pregnancy with visible cardiac activity is seen. The yolk sac is visible and measures approximately 0.44 cm in diameter.   The crown rump length of the fetus is measurable at 0.91 cm, consistent with 6 weeks and 6 days  Subchorionic hemorrhage was not seen  Right Ovary - NORMAL with CL cyst  Left Ovary - NORMAL   Comment: very small sub-chorion hemorrhage

## 2019-11-22 NOTE — PROGRESS NOTES
Current pregnancy history: 
  
Anya Dave is a 25 y.o. female who presents for the evaluation of pregnancy. No LMP recorded. 
  
LMP history: 
The date of her LMP is *** certain. Her last menstrual period was normal and lasted for 4 to 5 days. A urine pregnancy test was positive *** weeks ago. She was not on the pill at conception.  
  
Based on her LMP, her EDC is 20 and her EGA is 7 weeks,6 days. Her menstrual cycles are regular and occur approximately every 28 days  and range from 3 to 5 days. The last menses lasted *** the usual number of days. Ultrasound data: 
She had an  ultrasound done by the physician today which revealed a viable funez pregnancy with a gestational age of *** weeks and *** days giving an EDC of ***. 
  
Pregnancy symptoms: 
  
Since her LMP she has experienced  urinary frequency, breast tenderness, and nausea. She {HAS/HAS NOT:} been vomiting over the last few weeks. Associated signs and symptoms which she denies: dysuria, discharge, vaginal bleeding. 
  
She states she has *** gained weight:  Approximately *** pounds over the last few weeks. 
  
Relevant past pregnancy history: She has the following pregnancy history: SAB Her last pregnancy was uncomplicated. She has no history of  delivery. 
  
Relevant past medical history:(relevant to this pregnancy): noncontributory. Pap/Occupational history: 
Last pap smear: 3 years ago Results: Normal   
  
Her occupation is: ***.  
  
Substance history: negative for alcohol, tobacco and street drugs. Positive for nothing. *** Exposure history: There is/are no indoor cat/s in the home. The patient was instructed to not change the cat litter. She admits close contact with children on a regular basis. She has had chicken pox or the vaccine in the past.  
Patient denies issues with domestic violence. Genetic Screening/Teratology Counseling: (Includes patient, baby's father, or anyone in either family with:) 1.  Patient's age >/= 28 at [facility]?-- no  . 2. Th[place]assemia ([facility] [place], [address], or  background): MCV<80?--no.    
3.  Neural tube defect (meningomyelocele, spina bifida, anencephaly)?--no.  
4.  Congenital heart defect?--no. 
5.  Down syndrome?--no.  
6.  Luis-Sachs (Buddhism, [place])?--no.  
7.  Canavan's Disease?--no.  
8.  Familial Dysautonomia?--no.  
9.  Sickle cell disease or trait ()? --no The patient has not been tested for sickle trait 10. Hemophilia or other blood disorders?--no. 11.  Muscular dystrophy?--no. 12.  Cystic fibrosis?--no. 13.  Hall's Chorea?--no. 14.  Mental retardation/autism (if yes was person tested for Fragile X)?--no. 15.  Other inherited genetic or chromosomal disorder?--no. 12.  Maternal metabolic disorder (DM, PKU, etc)?--no. 17.  Patient or FOB with a child with a birth defect not listed above?--no. 
17a. Patient or FOB with a birth defect themselves?--no. 18.  Recurrent pregnancy loss, or stillbirth?--no. 19.  Any medications since LMP other than prenatal vitamins (include vitamins,     supplements, OTC meds, drugs, alcohol)?--no. 20.  Any other genetic/environmental exposure to discuss?--no. 
  
Infection History: 1. Lives with someone with TB or TB exposed?--no.  
2.  Patient or partner has history of genital herpes?--no. 
3.  Rash or viral illness since LMP?--no.   
4.  History of STD (GC, CT, HPV, syphilis, HIV)? --no  
5. Other: OTHER?   
  
    
Past Medical History:  
Diagnosis Date  Anemia    
 Pyelonephritis, unspecified 6/24/2012  Routine Papanicolaou smear 9/1/16 neg  Scoliosis    
  scoliosis  
  
     
Past Surgical History:  
Procedure Laterality Date  HX CHOLECYSTECTOMY   04/2016  HX OTHER SURGICAL   08/2016  
  Ureteroscopy  
  
Social History  
  
     
Occupational History  Not on file Tobacco Use  Smoking status: Former Smoker  Smokeless tobacco: Never Used Substance and Sexual Activity  Alcohol use: No  
 Drug use: No  
 Sexual activity: Yes  
    Partners: Male  
    Birth control/protection: None  
  
Family History Problem Relation Age of Onset  Asthma Mother    
 Diabetes Father    
  
  
No Known Allergies Prior to Admission medications Medication Sig Start Date End Date Taking? Authorizing Provider [id number] [address], 0.25-35 mg-mcg tab TAKE 1 TABLET BY MOUTH ONE TIME DAILY 9/10/18     Leonor Raymond MD  
[facility] [address], 0.25-35 mg-mcg tab TAKE 1 TABLET BY MOUTH ONE TIME DAILY 9/8/18     Leonor Raymond MD  
naproxen (NAPROSYN) 500 mg tablet Take 1 Tab by mouth two (2) times daily (with meals). 1/5/18     Britton Hashimoto, MD  
famotidine (PEPCID) 20 mg tablet Take 1 Tab by mouth two (2) times a day. 1/5/18     Britton Hashimoto, MD  
  
  
Review of Systems: History obtained from the patient Constitutional: negative for weight loss, fever, night sweats HEENT: negative for hearing loss, earache, congestion, snoring, sorethroat CV: negative for chest pain, palpitations, edema Resp: negative for cough, shortness of breath, wheezing Breast: negative for breast lumps, nipple discharge, galactorrhea GI: negative for change in bowel habits, abdominal pain, black or bloody stools : negative for frequency, dysuria, hematuria, vaginal discharge MSK: negative for back pain, joint pain, muscle pain Skin: negative for itching, rash, hives Neuro: negative for dizziness, headache, confusion, weakness Psych: negative for anxiety, depression, change in mood Heme/lymph: negative for bleeding, bruising, pallor 
  
Objective: There were no vitals taken for this visit. 
  
Physical Exam: PHYSICAL EXAMINATION 
  
Constitutional 
· Appearance: well-nourished, well developed, alert, in no acute distress 
  
HENT 
· Head · Face: appears normal 
 · Eyes: appear normal 
· Ears: normal 
· Mouth: normal 
· Lips: no lesions 
  
Neck · Inspection/Palpation: normal appearance, no masses or tenderness · Lymph Nodes: no lymphadenopathy present · Thyroid: gland size normal, nontender, no nodules or masses present on palpation 
  
Chest 
· Respiratory Effort: breathing unlabored · Auscultation: normal breath sounds 
  
Cardiovascular · Heart: 
· Auscultation: regular rate and rhythm without murmur 
  
Breasts · Inspection of Breasts: breasts symmetrical, no skin changes, no discharge present, nipple appearance normal, no skin retraction present · Palpation of Breasts and Axillae: no masses present on palpation, no breast tenderness · Axillary Lymph Nodes: no lymphadenopathy present 
  
Gastrointestinal 
· Abdominal Examination: abdomen non-tender to palpation, normal bowel sounds, no masses present · Liver and spleen: no hepatomegaly present, spleen not palpable · Hernias: no hernias identified 
  
Genitourinary · External Genitalia: normal appearance for age, no discharge present, no tenderness present, no inflammatory lesions present, no masses present, no atrophy present · Vagina: normal vaginal vault without central or paravaginal defects, no discharge present, no inflammatory lesions present, no masses present · Bladder: non-tender to palpation · Urethra: appears normal 
· Cervix: normal             
· Uterus: enlarged, normal shape, soft · Adnexa: no adnexal tenderness present, no adnexal masses present · Perineum: perineum within normal limits, no evidence of trauma, no rashes or skin lesions present · Anus: anus within normal limits, no hemorrhoids present · Inguinal Lymph Nodes: no lymphadenopathy present 
  
Skin · General Inspection: no rash, no lesions identified 
  
Neurologic/Psychiatric · Mental Status: · Orientation: grossly oriented to person, place and time · Mood and Affect: mood normal, affect appropriate 
  
Assessment: Intrauterine pregnancy with the following problems identified: ***. 
  
  
  
Plan:  
  
Offered CF testing, CVS, Nuchal Translucency, MSAFP, amnio, and discussed NIPT Course of pregnancy discussed including visit schedule, routine U/S, glucola testing, etc. 
Avoid alcoholic beverages and illicit/recreational drugs use Take prenatal vitamins or folic acid daily. Hospital and practice style discussed with coverage system. Discussed nutrition, toxoplasmosis precautions, sexual activity, exercise, need for influenza vaccine, environmental and work hazards, travel advice, screen for domestic violence, need for seat belts. Discussed seafood, unpasteurized dairy products, deli meat, artificial sweeteners, and caffeine. Information on prenatal classes/breastfeeding given. Information on circumcision given Patient encouraged not to smoke. Discussed current prescription drug use. Given medication list. 
Discussed the use of over the counter medications and chemicals. Route of delivery discussed, including risks, benefits, and alternatives of  versus repeat LTCS. Pt understands risk of hemorrhage during pregnancy and post delivery and would accept blood products if necessary in life-threatening emergencies 
  
  
Handouts given to pt. 
  
Transvaginal First Trimester Ultrasound Procedure 
  
Patrizia Busby is a [id number],  25 y.o. female [facility] No LMP recorded. This makes her currently *** weeks and *** days of gestation by dates. She is here today for early pregnancy ultrasound for pregnancy dating. Ultrasound results: A funez intrauterine pregnancy with visible cardiac activity is seen. The yolk sac is visible and measures approximately *** cm in diameter. The crown rump length of the fetus is measurable at *** cm, consistent with *** weeks and *** days Subchorionic hemorrhage was not seen Right Ovary - NORMAL Left Ovary - NORMAL Comment:

## 2019-11-25 ENCOUNTER — OFFICE VISIT (OUTPATIENT)
Dept: OBGYN CLINIC | Age: 24
End: 2019-11-25

## 2019-11-25 VITALS
BODY MASS INDEX: 29.66 KG/M2 | HEIGHT: 65 IN | DIASTOLIC BLOOD PRESSURE: 64 MMHG | SYSTOLIC BLOOD PRESSURE: 128 MMHG | WEIGHT: 178 LBS

## 2019-11-25 DIAGNOSIS — Z23 ENCOUNTER FOR IMMUNIZATION: ICD-10-CM

## 2019-11-25 DIAGNOSIS — N92.6 MISSED MENSES: ICD-10-CM

## 2019-11-25 DIAGNOSIS — Z32.01 PREGNANCY EXAMINATION OR TEST, POSITIVE RESULT: Primary | ICD-10-CM

## 2019-11-25 LAB
CHLAMYDIA, EXTERNAL: NEGATIVE
N. GONORRHEA, EXTERNAL: NEGATIVE
PAP SMEAR, EXTERNAL: NORMAL

## 2019-11-25 NOTE — PROGRESS NOTES
After obtaining consent, and per orders of Dr Martine Foreman, injection of Fluarix given in right deltoid by Kori Rubio LPN. Patient instructed to remain in clinic for 20 minutes afterwards, and to report any adverse reaction to me immediately. Lot: [id number] Exp: 06/30/20 NDC: 30838-522-26 , VIS given.

## 2019-11-25 NOTE — PATIENT INSTRUCTIONS

## 2019-11-29 LAB
C TRACH RRNA CVX QL NAA+PROBE: NEGATIVE
CYTOLOGIST CVX/VAG CYTO: ABNORMAL
CYTOLOGY CVX/VAG DOC CYTO: ABNORMAL
CYTOLOGY CVX/VAG DOC THIN PREP: ABNORMAL
CYTOLOGY HISTORY:: ABNORMAL
DX ICD CODE: ABNORMAL
DX ICD CODE: ABNORMAL
LABCORP, 190119: ABNORMAL
Lab: ABNORMAL
N GONORRHOEA RRNA CVX QL NAA+PROBE: NEGATIVE
OTHER STN SPEC: ABNORMAL
PATHOLOGIST CVX/VAG CYTO: ABNORMAL
STAT OF ADQ CVX/VAG CYTO-IMP: ABNORMAL
T VAGINALIS RRNA SPEC QL NAA+PROBE: NEGATIVE

## 2019-12-23 ENCOUNTER — ROUTINE PRENATAL (OUTPATIENT)
Dept: OBGYN CLINIC | Age: 24
End: 2019-12-23

## 2019-12-23 VITALS
DIASTOLIC BLOOD PRESSURE: 62 MMHG | BODY MASS INDEX: 30.16 KG/M2 | HEIGHT: 65 IN | WEIGHT: 181 LBS | SYSTOLIC BLOOD PRESSURE: 114 MMHG

## 2019-12-23 DIAGNOSIS — Z34.80 SUPERVISION OF OTHER NORMAL PREGNANCY, ANTEPARTUM: Primary | ICD-10-CM

## 2019-12-23 DIAGNOSIS — Z34.81 ENCOUNTER FOR SUPERVISION OF OTHER NORMAL PREGNANCY, FIRST TRIMESTER: ICD-10-CM

## 2019-12-23 LAB
ANTIBODY SCREEN, EXTERNAL: NEGATIVE
HBSAG, EXTERNAL: NEGATIVE
HCT, EXTERNAL: 35.7
HGB, EXTERNAL: 12.2
HIV, EXTERNAL: NEGATIVE
PLATELET CNT,   EXTERNAL: 269
RUBELLA, EXTERNAL: NORMAL
T. PALLIDUM, EXTERNAL: NEGATIVE
TYPE, ABO & RH, EXTERNAL: NORMAL

## 2019-12-25 LAB
ABO GROUP BLD: NORMAL
BLD GP AB SCN SERPL QL: NEGATIVE
ERYTHROCYTE [DISTWIDTH] IN BLOOD BY AUTOMATED COUNT: 13.3 % (ref 12.3–15.4)
HBV SURFACE AG SERPL QL IA: NEGATIVE
HCT VFR BLD AUTO: 35.7 % (ref 34–46.6)
HGB BLD-MCNC: 12.2 G/DL (ref 11.1–15.9)
HIV 1+2 AB+HIV1 P24 AG SERPL QL IA: NON REACTIVE
MCH RBC QN AUTO: 29.5 PG (ref 26.6–33)
MCHC RBC AUTO-ENTMCNC: 34.2 G/DL (ref 31.5–35.7)
MCV RBC AUTO: 86 FL (ref 79–97)
PLATELET # BLD AUTO: 269 X10E3/UL (ref 150–450)
RBC # BLD AUTO: 4.14 X10E6/UL (ref 3.77–5.28)
RH BLD: POSITIVE
RUBV IGG SERPL IA-ACNC: 3.92 INDEX
TREPONEMA PALLIDUM IGG+IGM AB [PRESENCE] IN SERUM OR PLASMA BY IMMUNOASSAY: NON REACTIVE
WBC # BLD AUTO: 8.6 X10E3/UL (ref 3.4–10.8)

## 2019-12-30 ENCOUNTER — TELEPHONE (OUTPATIENT)
Dept: OBGYN CLINIC | Age: 24
End: 2019-12-30

## 2019-12-31 ENCOUNTER — LAB ONLY (OUTPATIENT)
Dept: OBGYN CLINIC | Age: 24
End: 2019-12-31

## 2019-12-31 DIAGNOSIS — Z34.80 SUPERVISION OF OTHER NORMAL PREGNANCY, ANTEPARTUM: Primary | ICD-10-CM

## 2020-01-07 ENCOUNTER — TELEPHONE (OUTPATIENT)
Dept: OBGYN CLINIC | Age: 25
End: 2020-01-07

## 2020-01-07 NOTE — TELEPHONE ENCOUNTER
Call received at 339PM    25year old patient  15 wod pregnant patient last seen in the office on 19. Patient calling about her panorama lab results. This nurse advised that results are back. Results reviewed by work in MD, Dr. Florian Strange. Patient states her mother will call back for the results. Mother is on HIPPA form. Patient verbalized understanding.

## 2020-01-08 NOTE — TELEPHONE ENCOUNTER
Susie Palumbor, mother, cleared for all access is calling to get panorama results reported to her. Advised of low risk, male.

## 2020-01-27 ENCOUNTER — ROUTINE PRENATAL (OUTPATIENT)
Dept: OBGYN CLINIC | Age: 25
End: 2020-01-27

## 2020-01-27 VITALS
RESPIRATION RATE: 19 BRPM | BODY MASS INDEX: 30.32 KG/M2 | WEIGHT: 182 LBS | HEIGHT: 65 IN | DIASTOLIC BLOOD PRESSURE: 74 MMHG | SYSTOLIC BLOOD PRESSURE: 128 MMHG

## 2020-01-27 DIAGNOSIS — R87.613 HGSIL ON PAP SMEAR OF CERVIX: Primary | ICD-10-CM

## 2020-01-27 DIAGNOSIS — Z3A.15 15 WEEKS GESTATION OF PREGNANCY: ICD-10-CM

## 2020-01-27 LAB
AFP, MATERNAL, EXTERNAL: NEGATIVE
GTT, 1 HR, GLUCOLA, EXTERNAL: 125

## 2020-01-29 LAB
AFP ADJ MOM SERPL: 0.85
AFP INTERP SERPL-IMP: NORMAL
AFP INTERP SERPL-IMP: NORMAL
AFP SERPL-MCNC: 23.7 NG/ML
AGE AT DELIVERY: 24.9 YR
COMMENT, 018013: NORMAL
GA METHOD: NORMAL
GA: 15.6 WEEKS
GLUCOSE 1H P 50 G GLC PO SERPL-MCNC: 125 MG/DL (ref 65–139)
IDDM PATIENT QL: NO
MULTIPLE PREGNANCY: NO
NEURAL TUBE DEFECT RISK FETUS: NORMAL %
RESULTS, 017004: NORMAL

## 2020-01-30 ENCOUNTER — ROUTINE PRENATAL (OUTPATIENT)
Dept: OBGYN CLINIC | Age: 25
End: 2020-01-30

## 2020-01-30 VITALS
SYSTOLIC BLOOD PRESSURE: 120 MMHG | WEIGHT: 184 LBS | BODY MASS INDEX: 30.66 KG/M2 | HEIGHT: 65 IN | DIASTOLIC BLOOD PRESSURE: 68 MMHG

## 2020-01-30 DIAGNOSIS — Z34.81 ENCOUNTER FOR SUPERVISION OF OTHER NORMAL PREGNANCY, FIRST TRIMESTER: ICD-10-CM

## 2020-01-30 DIAGNOSIS — R87.613 HGSIL (HIGH GRADE SQUAMOUS INTRAEPITHELIAL LESION) ON PAP SMEAR OF CERVIX: Primary | ICD-10-CM

## 2020-01-30 NOTE — PATIENT INSTRUCTIONS
Colposcopy: What to Expect at [address] may feel some soreness in your vagina for a day or two if you had a biopsy. Some vaginal bleeding or discharge is normal for up to a week after a biopsy. The discharge may be dark-colored if a solution was put on your cervix. You can use a sanitary pad for the bleeding. It may take a week or two for you to get the test results. This care sheet gives you a general idea about how long it will take for you to recover. But each person recovers at a different pace. Follow the steps below to feel better as quickly as possible. How can you care for yourself at home? Activity    · You can return to work and most daily activities right after the test.   Exercise    · Do not exercise for 1 day after the test.   Medicines    · Your doctor will tell you if and when you can restart your medicines. He or she will also give you instructions about taking any new medicines.     · If you take blood thinners, such as warfarin (Coumadin), clopidogrel (Plavix), or aspirin, be sure to talk to your doctor. He or she will tell you if and when to start taking those medicines again. Make sure that you understand exactly what your doctor wants you to do.     · Take an over-the-counter pain medicine, such as acetaminophen (Tylenol), ibuprofen (Advil, Motrin), or naproxen (Aleve). Be safe with medicines. Read and follow all instructions on the label. Do not take two or more pain medicines at the same time unless the doctor told you to. Many pain medicines have acetaminophen, which is Tylenol. Too much acetaminophen (Tylenol) can be harmful. Other instructions    · Use a pad if you have some bleeding.     · Do not douche, have sexual intercourse, or use tampons for 1 week if you had a biopsy. This will allow time for your cervix to heal.     · You can take a bath or shower anytime after the test.   Follow-up care is a key part of your treatment and safety.  Be sure to make and go to all appointments, and call your doctor if you are having problems. It's also a good idea to know your test results and keep a list of the medicines you take. When should you call for help? Call your doctor now or seek immediate medical care if:    · You have severe vaginal bleeding. This means that you are soaking through your usual pads or tampons each hour for 2 or more hours.     · You have pain that does not get better after you take pain medicine.     · You have signs of infection, such as:  ? Increased pain. ? Bad-smelling vaginal discharge. ? A fever.    Watch closely for any changes in your health, and be sure to contact your doctor if:    · You have questions or concerns. Where can you learn more? Go to http://ghada-farrukh.info/. Enter M523 in the search box to learn more about \"Colposcopy: What to Expect at Home. \"  Current as of: December 19, 2018  Content Version: 12.2  © 6751-5838 [employer]. Care instructions adapted under license by [employer] (which disclaims liability or warranty for this information). If you have questions about a medical condition or this instruction, always ask your healthcare professional. [address] any warranty or liability for your use of this information.

## 2020-02-27 ENCOUNTER — ROUTINE PRENATAL (OUTPATIENT)
Dept: OBGYN CLINIC | Age: 25
End: 2020-02-27

## 2020-02-27 VITALS — BODY MASS INDEX: 30.45 KG/M2 | WEIGHT: 183 LBS | DIASTOLIC BLOOD PRESSURE: 76 MMHG | SYSTOLIC BLOOD PRESSURE: 120 MMHG

## 2020-02-27 DIAGNOSIS — Z34.81 ENCOUNTER FOR SUPERVISION OF OTHER NORMAL PREGNANCY, FIRST TRIMESTER: ICD-10-CM

## 2020-02-27 DIAGNOSIS — Z34.80 PRENATAL CARE OF MULTIGRAVIDA, ANTEPARTUM: Primary | ICD-10-CM

## 2020-02-27 NOTE — PROGRESS NOTES
_ [facility]  [email or web address]  [phone]    Ike Tang MD, FACOG     Follow-up OB visit    Chief Complaint   Patient presents with    Routine Prenatal Visit       Vitals:    20 1403   BP: 120/76   Weight: 183 lb (83 kg)       Patient Active Problem List    Diagnosis Date Noted    Encounter for supervision of other normal pregnancy, first trimester 07/10/2014    Anemia 2014    Back pain complicating pregnancy     Pyelonephritis, unspecified 2012    Nausea with vomiting 2012        The patient reports the following concerns: none  Ultrasound showed: FETAL SURVEY  A SINGLE VIABLE IUP AT 20W2D GA BY LMP. FETAL CARDIAC MOTION OBSERVED. FETAL ANATOMY WELL VISUALIZED AND APPEARS WITHIN NORMAL LIMITS. NO ABNORMALITIES IDENTIFIED ON TODAYS EXAM.  ANATOMY NOT SEEN: RVOT, LVOT, NOSE/LIPS. FOLLOW UP NEEDED TO COMPLETE SURVEY. APPROPRIATE GROWTH MEASURED; SIZE = DATES. NIRAV AND CERVIX APPEAR WITHIN NORMAL LIMITS. PLACENTA CORD-INSERTION APPEARS MARGINAL. GENDER: XY    Prenatal Visit    Vitals:    20 1403   BP: 120/76   Weight: 183 lb (83 kg)     See PN flowsheet for exam      25 y.o.  20w2d   Encounter Diagnosis   Name Primary?     Prenatal care of multigravida, antepartum Yes        [] SAB/bleeding precautions reviewed   [] PTL/PPROM precautions reviewed   [] Labor precautions reviewed   [] Fetal kick counts discussed   [] Labs reviewed with patient   [] [place] precautions reviewed   [] Consent reviewed   [] Handouts given to pt   [] Glucola handout    [] GBS/labor/Magic Hour handout   []    [] We reviewed CDC recommendations for Tdap for patient and close contacts and RBA of receiving in pregnancy, advised obtaining in third trimester   [] Reviewed healthy nutrition in pregnancy and good exercise practices   [] We disc safer medications in pregnancy and referred patient to [facility] resources   [] We reviewed CDC recommendations for flu vaccine and RBA of receiving in pregnancy   []    []    []       Follow-up and Dispositions    · Return in about 4 weeks (around 3/26/2020). No orders of the defined types were placed in this encounter.       Troy Doyle MD

## 2020-02-27 NOTE — PATIENT INSTRUCTIONS
Learning About When to Call Your Doctor During Pregnancy (After 20 Weeks)  Your Care Instructions  It's common to have concerns about what might be a problem during pregnancy. Although most pregnant women don't have any serious problems, it's important to know when to call your doctor if you have certain symptoms or signs of labor. These are general suggestions. Your doctor may give you some more information about when to call. When to call your doctor (after 20 weeks)  Call 911 anytime you think you may need emergency care. For example, call if:  · You have severe vaginal bleeding. · You have sudden, severe pain in your belly. · You passed out (lost consciousness). · You have a seizure. · You see or feel the umbilical cord. · You think you are about to deliver your baby and can't make it safely to the hospital.  Call your doctor now or seek immediate medical care if:  · You have vaginal bleeding. · You have belly pain. · You have a fever. · You have symptoms of preeclampsia, such as:  ? Sudden swelling of your face, hands, or feet. ? New vision problems (such as dimness, blurring, or seeing spots). ? A severe headache. · You have a sudden release of fluid from your vagina. (You think your water broke.)  · You think that you may be in labor. This means that you've had at least 6 contractions in an hour. · You notice that your baby has stopped moving or is moving much less than normal.  · You have symptoms of a urinary tract infection. These may include:  ? Pain or burning when you urinate. ? A frequent need to urinate without being able to pass much urine. ? Pain in the flank, which is just below the rib cage and above the waist on either side of the back. ? Blood in your urine. Watch closely for changes in your health, and be sure to contact your doctor if:  · You have vaginal discharge that smells bad. · You have skin changes, such as:  ? A rash. ? Itching.   ? Yellow color to your skin.  · You have other concerns about your pregnancy. If you have labor signs at 37 weeks or more  If you have signs of labor at 37 weeks or more, your doctor may tell you to call when your labor becomes more active. Symptoms of active labor include:  · Contractions that are regular. · Contractions that are less than 5 minutes apart. · Contractions that are hard to talk through. Follow-up care is a key part of your treatment and safety. Be sure to make and go to all appointments, and call your doctor if you are having problems. It's also a good idea to know your test results and keep a list of the medicines you take. Where can you learn more? Go to http://ghada-farrukh.info/. Enter  in the search box to learn more about \"Learning About When to Call Your Doctor During Pregnancy (After 20 Weeks). \"  Current as of: May 29, 2019  Content Version: 12.2  © 0294-6267 [employer]. Care instructions adapted under license by [employer] (which disclaims liability or warranty for this information). If you have questions about a medical condition or this instruction, always ask your healthcare professional. [place] [address] any warranty or liability for your use of this information. Weeks 18 to 22 of Your Pregnancy: Care Instructions  Your Care Instructions    Your baby is continuing to develop quickly. At this stage, babies can now suck their thumbs,  firmly with their hands, and open and close their eyelids. Sometime between 18 and 22 weeks, you will start to feel your baby move. At first, these small fetal movements feel like fluttering or \"butterflies. \" Some women say that they feel like gas bubbles. As the baby grows, these movements will become stronger. You may also notice that your baby kicks and hiccups. During this time, you may find that your nausea and fatigue are gone.  Overall, you may feel better and have more energy than you did in your first trimester. But you may also have new discomforts now, such as sleep problems or leg cramps. This care sheet can help you ease these discomforts. Follow-up care is a key part of your treatment and safety. Be sure to make and go to all appointments, and call your doctor if you are having problems. It's also a good idea to know your test results and keep a list of the medicines you take. How can you care for yourself at home? Ease sleep problems  · Avoid caffeine in drinks or chocolate late in the day. · Get some exercise every day. · Take a warm shower or bath before bed. · Have a light snack or glass of milk at bedtime. · Do relaxation exercises in bed to calm your mind and body. · Support your legs and back with extra pillows. Try a pillow between your legs if you sleep on your side. · Do not use sleeping pills or alcohol. They could harm your baby. Ease leg cramps  · Do not massage your calf during the cramp. · Sit on a firm bed or chair. Straighten your leg, and bend your foot (flex your ankle) slowly upward, toward your knee. Bend your toes up and down. · Stand on a cool, flat surface. Stretch your toes upward, and take small steps walking on your heels. · Use a heating pad or hot water bottle to help with muscle ache. Prevent leg cramps  · Be sure to get enough calcium. If you are worried that you are not getting enough, talk to your doctor. · Exercise every day, and stretch your legs before bed. · Take a warm bath before bed, and try leg warmers at night. Where can you learn more? Go to http://ghada-farrukh.info/. Enter T463 in the search box to learn more about \"Weeks 18 to 22 of Your Pregnancy: Care Instructions. \"  Current as of: May 29, 2019  Content Version: 12.2  © 5107-2097 [employer]. Care instructions adapted under license by [employer] (which disclaims liability or warranty for this information).  If you have questions about a medical condition or this instruction, always ask your healthcare professional. [address] any warranty or liability for your use of this information. Weeks 22 to 26 of Your Pregnancy: Care Instructions  Your Care Instructions    As you enter your 7th month of pregnancy at week 26, your baby's lungs are growing stronger and getting ready to breathe. You may notice that your baby responds to the sound of your or your partner's voice. You may also notice that your baby does less turning and twisting and more squirming or jerking. Jerking often means that your baby has the hiccups. Hiccups are perfectly normal and are only temporary. You may want to think about attending a childbirth preparation class. This is also a good time to start thinking about whether you want to have pain medicine during labor. Most pregnant women are tested for gestational diabetes between weeks 25 and 28. Gestational diabetes occurs when your blood sugar level gets too high when you're pregnant. The test is important, because you can have gestational diabetes and not know it. But the condition can cause problems for your baby. Follow-up care is a key part of your treatment and safety. Be sure to make and go to all appointments, and call your doctor if you are having problems. It's also a good idea to know your test results and keep a list of the medicines you take. How can you care for yourself at home? Ease discomfort from your baby's kicking  · Change your position. Sometimes this will cause your baby to change position too. · Take a deep breath while you raise your arm over your head. Then breathe out while you drop your arm. Do Kegel exercises to prevent urine from leaking  · You can do Kegel exercises while you stand or sit. ? Squeeze the same muscles you would use to stop your urine. Your belly and thighs should not move. ?  Hold the squeeze for 3 seconds, and then relax for 3 seconds. ? Start with 3 seconds. Then add 1 second each week until you are able to squeeze for 10 seconds. ? Repeat the exercise 10 to 15 times for each session. Do three or more sessions each day. Ease or reduce swelling in your feet, ankles, hands, and fingers  · If your fingers are puffy, take off your rings. · Do not eat high-salt foods, such as potato chips. · Prop up your feet on a stool or couch as much as possible. Sleep with pillows under your feet. · Do not stand for long periods of time or wear tight shoes. · Wear support stockings. Where can you learn more? Go to http://ghada-farrukh.info/. Enter G264 in the search box to learn more about \"Weeks 22 to 26 of Your Pregnancy: Care Instructions. \"  Current as of: May 29, 2019  Content Version: 12.2  © 2114-8447 [employer]. Care instructions adapted under license by [employer] (which disclaims liability or warranty for this information). If you have questions about a medical condition or this instruction, always ask your healthcare professional. [address] any warranty or liability for your use of this information.

## 2020-03-18 ENCOUNTER — TELEPHONE (OUTPATIENT)
Dept: OBGYN CLINIC | Age: 25
End: 2020-03-18

## 2020-03-18 NOTE — TELEPHONE ENCOUNTER
Yes, we can send a letter x 2 weeks, and then reevaluate the COVID situation. Shelli: pls mail.     Melvin Castro MD      [id number]

## 2020-03-18 NOTE — TELEPHONE ENCOUNTER
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 Okay to go ahead and write?

## 2020-03-26 NOTE — PATIENT INSTRUCTIONS
Learning About When to Call Your Doctor During Pregnancy (After 20 Weeks)  Your Care Instructions  It's common to have concerns about what might be a problem during pregnancy. Although most pregnant women don't have any serious problems, it's important to know when to call your doctor if you have certain symptoms or signs of labor. These are general suggestions. Your doctor may give you some more information about when to call. When to call your doctor (after 20 weeks)  Call 911 anytime you think you may need emergency care. For example, call if:  · You have severe vaginal bleeding. · You have sudden, severe pain in your belly. · You passed out (lost consciousness). · You have a seizure. · You see or feel the umbilical cord. · You think you are about to deliver your baby and can't make it safely to the hospital.  Call your doctor now or seek immediate medical care if:  · You have vaginal bleeding. · You have belly pain. · You have a fever. · You have symptoms of preeclampsia, such as:  ? Sudden swelling of your face, hands, or feet. ? New vision problems (such as dimness, blurring, or seeing spots). ? A severe headache. · You have a sudden release of fluid from your vagina. (You think your water broke.)  · You think that you may be in labor. This means that you've had at least 6 contractions in an hour. · You notice that your baby has stopped moving or is moving much less than normal.  · You have symptoms of a urinary tract infection. These may include:  ? Pain or burning when you urinate. ? A frequent need to urinate without being able to pass much urine. ? Pain in the flank, which is just below the rib cage and above the waist on either side of the back. ? Blood in your urine. Watch closely for changes in your health, and be sure to contact your doctor if:  · You have vaginal discharge that smells bad. · You have skin changes, such as:  ? A rash. ? Itching.   ? Yellow color to your skin.  · You have other concerns about your pregnancy. If you have labor signs at 37 weeks or more  If you have signs of labor at 37 weeks or more, your doctor may tell you to call when your labor becomes more active. Symptoms of active labor include:  · Contractions that are regular. · Contractions that are less than 5 minutes apart. · Contractions that are hard to talk through. Follow-up care is a key part of your treatment and safety. Be sure to make and go to all appointments, and call your doctor if you are having problems. It's also a good idea to know your test results and keep a list of the medicines you take. Where can you learn more? Go to http://ghada-farrukh.info/  Enter N531 in the search box to learn more about \"Learning About When to Call Your Doctor During Pregnancy (After 20 Weeks). \"  Current as of: May 29, 2019Content Version: 12.4  © 3264-2471 Healthwise, Incorporated. Care instructions adapted under license by [employer] (which disclaims liability or warranty for this information). If you have questions about a medical condition or this instruction, always ask your healthcare professional. [address] any warranty or liability for your use of this information.

## 2020-03-27 ENCOUNTER — ROUTINE PRENATAL (OUTPATIENT)
Dept: OBGYN CLINIC | Age: 25
End: 2020-03-27

## 2020-03-27 VITALS
DIASTOLIC BLOOD PRESSURE: 72 MMHG | BODY MASS INDEX: 31.16 KG/M2 | WEIGHT: 187 LBS | SYSTOLIC BLOOD PRESSURE: 112 MMHG | HEIGHT: 65 IN

## 2020-03-27 DIAGNOSIS — Z34.81 ENCOUNTER FOR SUPERVISION OF OTHER NORMAL PREGNANCY, FIRST TRIMESTER: Primary | ICD-10-CM

## 2020-03-27 NOTE — PROGRESS NOTES
[facility]  [email or web address]  [phone]    Kailey Chan MD, FACOG        [facility]   FOLLOW UP OB NOTE WITH ULTRASOUND    Chief Complaint   Patient presents with    Routine Prenatal Visit       The patient reports the following concerns: none    I discussed with the patient the limitations of ultrasound and that imaging can not rule out all birth defects, chromosomal problems, or other problems with the baby. US findings:      LIMITED OB SCAN  A SINGLE 24W3D IUP IS SEEN. FETAL CARDIAC MOTION OBSERVED. LIMITED ANATOMY WAS VISUALIZED AND APPEARS WNL. RVOT, LVOT AND NOSE/LIPS WERE WELL SEEN  AND APPEAR WNL. FETAL SURVEY WAS COMPLETED. APPROPRIATE FETAL GROWTH IS SEEN. SIZE = DATES. NIRAV AND PLACENTA APPEAR WNL. Physician review of ultrasound performed by technician    Today's ultrasound report and images were reviewed and discussed with the patient.   Please see images and imaging report entered by technician in PACS for more detail and progress note and diagnosis entered by MD.    Cecelia Akbar MD

## 2020-04-23 ENCOUNTER — ROUTINE PRENATAL (OUTPATIENT)
Dept: OBGYN CLINIC | Age: 25
End: 2020-04-23

## 2020-04-23 ENCOUNTER — HOSPITAL ENCOUNTER (OUTPATIENT)
Dept: LAB | Age: 25
Discharge: HOME | End: 2020-04-23

## 2020-04-23 VITALS
BODY MASS INDEX: 31.79 KG/M2 | SYSTOLIC BLOOD PRESSURE: 110 MMHG | WEIGHT: 190.8 LBS | HEIGHT: 65 IN | DIASTOLIC BLOOD PRESSURE: 60 MMHG

## 2020-04-23 DIAGNOSIS — Z34.81 ENCOUNTER FOR SUPERVISION OF OTHER NORMAL PREGNANCY, FIRST TRIMESTER: Primary | ICD-10-CM

## 2020-04-23 DIAGNOSIS — Z23 ENCOUNTER FOR IMMUNIZATION: ICD-10-CM

## 2020-04-23 DIAGNOSIS — Z36.2 ENCOUNTER FOR OTHER ANTENATAL SCREENING FOLLOW-UP: ICD-10-CM

## 2020-04-23 LAB
ERYTHROCYTE [DISTWIDTH] IN BLOOD BY AUTOMATED COUNT: 14 % (ref 11.5–14.5)
GLUCOSE 1H P 100 G GLC PO SERPL-MCNC: 132 MG/DL (ref 65–140)
GTT, 1 HR, GLUCOLA, EXTERNAL: 132
HCT VFR BLD AUTO: 30.7 % (ref 35–47)
HCT, EXTERNAL: 30.7
HGB BLD-MCNC: 9.4 G/DL (ref 11.5–16)
HGB, EXTERNAL: 9.4
MCH RBC QN AUTO: 26.3 PG (ref 26–34)
MCHC RBC AUTO-ENTMCNC: 30.6 G/DL (ref 30–36.5)
MCV RBC AUTO: 85.8 FL (ref 80–99)
NRBC # BLD: 0 K/UL (ref 0–0.01)
NRBC BLD-RTO: 0 PER 100 WBC
PLATELET # BLD AUTO: 170 K/UL (ref 150–400)
PLATELET CNT,   EXTERNAL: 170
PMV BLD AUTO: 11.2 FL (ref 8.9–12.9)
RBC # BLD AUTO: 3.58 M/UL (ref 3.8–5.2)
WBC # BLD AUTO: 11.8 K/UL (ref 3.6–11)

## 2020-04-23 NOTE — PROGRESS NOTES
[facility]  [email or web address]  [phone]    Ava Lee MD, FACOG       [facility]   FOLLOW UP OB NOTE WITH ULTRASOUND    Chief Complaint   Patient presents with    Routine Prenatal Visit    Colposcopy       The patient reports the following concerns: none    Pt declined colpo until PP. Understands risk of undiagnosed cx cancer and c-hyst if needed. I discussed with the patient the limitations of ultrasound and that imaging can not rule out all birth defects, chromosomal problems, or other problems with the baby. US findings:    LIMITED OB SCAN  A SINGLE VERTEX 28W2D IUP IS SEEN. FETAL CARDIAC MOTION OBSERVED. LIMITED ANATOMY WAS VISUALIZED AND APPEARS WNL. APPROPRIATE FETAL GROWTH IS SEEN. SIZE=DATES. NIRAV AND PLACENTA APPEAR WITHIN NORMAL LIMITS. Tdap        Physician review of ultrasound performed by technician    Today's ultrasound report and images were reviewed and discussed with the patient.   Please see images and imaging report entered by technician in PACS for more detail and progress note and diagnosis entered by MD.    Carolyn Case MD

## 2020-04-23 NOTE — PATIENT INSTRUCTIONS

## 2020-04-24 NOTE — PROGRESS NOTES
28 week labs OK except anemia. Recommend iron per protocol. Notify patient and add to PN Labs. Add #.# hgb (g/dL) to pregnancy  problem list  Repeat CBC 4 wk or next office visit.  (?36)

## 2020-05-21 ENCOUNTER — OFFICE VISIT (OUTPATIENT)
Dept: OBGYN CLINIC | Age: 25
End: 2020-05-21

## 2020-05-21 ENCOUNTER — VIRTUAL VISIT (OUTPATIENT)
Dept: OBGYN CLINIC | Age: 25
End: 2020-05-21

## 2020-05-21 VITALS — BODY MASS INDEX: 31.65 KG/M2 | WEIGHT: 190 LBS | HEIGHT: 65 IN

## 2020-05-21 VITALS — HEIGHT: 65 IN | BODY MASS INDEX: 31.65 KG/M2 | WEIGHT: 190 LBS

## 2020-05-21 DIAGNOSIS — Z34.81 ENCOUNTER FOR SUPERVISION OF OTHER NORMAL PREGNANCY, FIRST TRIMESTER: Primary | ICD-10-CM

## 2020-05-21 DIAGNOSIS — Z34.80 PRENATAL CARE OF MULTIGRAVIDA, ANTEPARTUM: Primary | ICD-10-CM

## 2020-05-21 RX ORDER — LANOLIN ALCOHOL/MO/W.PET/CERES
325 CREAM (GRAM) TOPICAL
COMMUNITY

## 2020-05-21 RX ORDER — OMEPRAZOLE 20 MG/1
20 CAPSULE, DELAYED RELEASE ORAL DAILY
COMMUNITY

## 2020-05-21 NOTE — PATIENT INSTRUCTIONS
Weeks 32 to 34 of Your Pregnancy: Care Instructions  Your Care Instructions    During the last few weeks of your pregnancy, you may have more aches and pains. It's important to rest when you can. Your growing baby is putting more pressure on your bladder. So you may need to urinate more often. Hemorrhoids are also common. These are painful, itchy veins in the rectal area. In the 36th week, most women have a test for group B streptococcus (GBS). GBS is a common bacteria that can live in the vagina and rectum. It can make your baby sick after birth. If you test positive, you will get antibiotics during labor. These will keep your baby from getting the bacteria. You may want to talk with your doctor about banking your baby's umbilical cord blood. This is the blood left in the cord after birth. If you want to save this blood, you must arrange it ahead of time. You can't decide at the last minute. If you haven't already had the Tdap shot during this pregnancy, talk to your doctor about getting it. It will help protect your  against pertussis infection. Follow-up care is a key part of your treatment and safety. Be sure to make and go to all appointments, and call your doctor if you are having problems. It's also a good idea to know your test results and keep a list of the medicines you take. How can you care for yourself at home? Ease hemorrhoids  · Get more liquids, fruits, vegetables, and fiber in your diet. This will help keep your stools soft. · Avoid sitting for too long. Lie on your left side several times a day. · Clean yourself with soft, moist toilet paper. Or you can use witch hazel pads or personal hygiene pads. · If you are uncomfortable, try ice packs. Or you can sit in a warm sitz bath. Do these for 20 minutes at a time, as needed. · Use hydrocortisone cream for pain and itching. Two examples are Anusol and Preparation H Hydrocortisone.   · Ask your doctor about taking an over-the-counter stool softener. Consider breastfeeding  · Experts recommend that women breastfeed for 1 year or longer. Breast milk is the perfect food for babies. · Breast milk is easier for babies to digest than formula. And it is always available, just the right temperature, and free. · Breast milk may help protect your child from some health problems.  babies are less likely than formula-fed babies to:  ? Get ear infections, colds, diarrhea, and pneumonia. ? Be obese or get diabetes later in life. · Women who breastfeed have less bleeding after the birth. Their uteruses also shrink back faster. · Some women who breastfeed lose weight faster. Making milk burns calories. · Breastfeeding can lower your risk of breast cancer, ovarian cancer, and osteoporosis. Decide about circumcision for boys  · As you make this decision, it may help to think about your personal, Restorationist, and family traditions. You get to decide if you will keep your son's penis natural or if he will be circumcised. · If you decide that you would like to have your baby circumcised, talk with your doctor. You can share your concerns about pain. And you can discuss your preferences for anesthesia. Where can you learn more? Go to http://ghada-farrukh.info/  Enter X711 in the search box to learn more about \"Weeks 32 to 34 of Your Pregnancy: Care Instructions. \"  Current as of: May 29, 2019Content Version: 12.4  © 1172-8173 Healthwise, Incorporated. Care instructions adapted under license by [employer] (which disclaims liability or warranty for this information). If you have questions about a medical condition or this instruction, always ask your healthcare professional. [place] [address] any warranty or liability for your use of this information.

## 2020-05-21 NOTE — PROGRESS NOTES
[employer] Video visit    Christi Alfaro 25 y.o. [place]  female who was seen by synchronous (real-time) audio-video technology on 5/21/2020. We discussed the expected course, resolution and complications of the diagnosis(es) in detail. Medication risks, benefits, costs, interactions, and alternatives were discussed as indicated. I advised her to contact the office if her condition worsens, changes or fails to improve as anticipated. She expressed understanding with the diagnosis(es) and plan. Pursuant to the emergency declaration under the [facility] waiver authority and the [employer] Act, this Virtual  Visit was conducted, with patient's consent, to reduce the patient's risk of exposure to COVID-19 and provide continuity of care for an established patient. Services were provided through a video synchronous discussion virtually to substitute for in-person clinic visit. [facility]  [email or web address]  [phone]    César Monroe MD, FACOG     Follow-up OB visit    Chief Complaint   Patient presents with    Routine Prenatal Visit       Next appointment date: 06/04/2020  In office: No     Vitals:    05/21/20 1034   Weight: 190 lb (86.2 kg)   Height: 5' 5\" (1.651 m)       Patient Active Problem List    Diagnosis Date Noted    Prenatal care of multigravida, antepartum 07/10/2014    Anemia 06/17/2014    Back pain complicating pregnancy 41/30/1483    Pyelonephritis, unspecified 06/24/2012    Nausea with vomiting 06/24/2012        The patient reports the following concerns of tingling sensations in her feet. Pain in abdomen and pelvic pressure. Patient states symptoms began today. Pain radiates down legs. +contact with family.    Drinking water  No UTI prec  Home doppler 140s      Prenatal Visit    Vitals:    05/21/20 1034   Weight: 190 lb (86.2 kg) Height: 5' 5\" (1.651 m)     See PN flowsheet for exam    Objective:     General: alert, cooperative, no distress   Mental  status: mental status: alert, oriented to person, place, and time, normal mood, behavior, speech, dress, motor activity, and thought processes   Resp: resp: normal effort and no respiratory distress   Neuro: neuro: no gross deficits   Skin: skin: no discoloration or lesions of concern on visible areas   Due to this being a TeleHealth evaluation, many elements of the physical examination are unable to be assessed. 25 y.o. [place] 32w2d   Encounter Diagnosis   Name Primary?  Encounter for supervision of other normal pregnancy, first trimester Yes       This patient was seen virtually during the COVID-19 pandemic. Please note some clinical care decisions may be influenced because of the current outbreak. Disc discomforts of IUP vs ptl vs infection  reviewed uti precautions  OV if NI or at NV       [] SAB/bleeding precautions reviewed   [] PTL/PPROM precautions reviewed   [] Labor precautions reviewed   [] Fetal kick counts discussed   [] Labs reviewed with patient   [] Lana Duvall precautions reviewed   [] Consent reviewed   [] Handouts given to pt   [] Glucola handout    [] GBS/labor/Magic Hour handout   []    [] We reviewed CDC recommendations for Tdap for patient and close contacts and RBA of receiving in pregnancy, advised obtaining in third trimester   [] Reviewed healthy nutrition in pregnancy and good exercise practices   [] We disc safer medications in pregnancy and referred patient to [facility]ATHIE resources   [] We reviewed CDC recommendations for flu vaccine and RBA of receiving in pregnancy   []    []    []       Follow-up and Dispositions    · Return in about 2 weeks (around 6/4/2020) for Follow up OB visit. No orders of the defined types were placed in this encounter.       Shaka Sherman MD

## 2020-06-04 ENCOUNTER — ROUTINE PRENATAL (OUTPATIENT)
Dept: OBGYN CLINIC | Age: 25
End: 2020-06-04

## 2020-06-04 VITALS
BODY MASS INDEX: 32.86 KG/M2 | WEIGHT: 197.2 LBS | SYSTOLIC BLOOD PRESSURE: 125 MMHG | HEIGHT: 65 IN | DIASTOLIC BLOOD PRESSURE: 78 MMHG

## 2020-06-04 DIAGNOSIS — Z34.80 PRENATAL CARE OF MULTIGRAVIDA, ANTEPARTUM: Primary | ICD-10-CM

## 2020-06-04 NOTE — PROGRESS NOTES
[facility]  [email or web address]  [phone]    Jose Thurman MD, FACOG     Follow-up OB visit    Chief Complaint   Patient presents with   [facility] Routine Prenatal Visit       Patient Active Problem List    Diagnosis Date Noted    Prenatal care of multigravida, antepartum 07/10/2014    Anemia 2014    Back pain complicating pregnancy     Pyelonephritis, unspecified 2012    Nausea with vomiting 2012        The patient reports the following concerns: c/o PABLO [facility] [place]    Vitals:    20 1100   BP: 125/78   Weight: 197 lb 3.2 oz (89.4 kg)   Height: 5' 5\" (1.651 m)     See PN flowsheet for exam    25 y.o.  34w2d   Encounter Diagnosis   Name Primary?  Prenatal care of multigravida, antepartum Yes     Disc r/b/a of induction and pitocin use and increase risk of longer labor,  section, bleeding and infection. [] SAB/bleeding precautions reviewed   [] PTL/PPROM precautions reviewed   [] Labor precautions reviewed   [] Fetal kick counts discussed   [] Labs reviewed with patient   [] Yoko Mink precautions reviewed   [] Consent reviewed   [] Handouts given to pt   [] Glucola handout    [] GBS/labor/Magic Hour handout   []    [] We reviewed CDC recommendations for Tdap for patient and close contacts and RBA of receiving in pregnancy, advised obtaining in third trimester   [] Reviewed healthy nutrition in pregnancy and good exercise practices   [] We disc safer medications in pregnancy and referred patient to [facility] KATHIE resources   [] We reviewed CDC recommendations for flu vaccine and RBA of receiving in pregnancy   []    []    []           No orders of the defined types were placed in this encounter.       Jose Thurman MD

## 2020-06-12 NOTE — TELEPHONE ENCOUNTER
[address] [place] [address] Dr Hoover patient was call    Patient is very disappointed that she did not have the Panorama testing done because she has upcoming gender reveal party on 1/11/19. When can she come in to have this drawn? She has already spoken to [employer] at [place]. I advised that I would send message to covering nurse to see when she could have this done but there would be no guarantee that she could get this done before her party.
Patient was advised that she could come into office today or tomorrow and she declined. She just said \"forget it\". She will call back to schedule lab only if she changes her mind.
Yes

## 2020-06-17 NOTE — PATIENT INSTRUCTIONS
Learning About Group B Strep in Newborns  What is group B strep? Group B streptococcus (strep) is a serious bacterial infection. Newborns may have the infection hours after delivery. Or it can develop during the first few weeks after birth. When an infant is diagnosed with group B strep right after birth, he or she will stay in the hospital for treatment. This care sheet is for parents who have taken their baby home. It will help caregivers recognize symptoms of the infection if it develops later. This is known as late onset group B strep. This type of strep is not the same as the type that causes strep throat. If your baby has group B strep, he or she will need to be treated in a hospital. Your baby may need special care, such as being in the intensive care unit (ICU) in the hospital. This may be scary for you. But the hospital staff understands this. They will explain what happens and will answer your questions. Newborns infected with group B strep may get a blood infection (sepsis) or lung infection (pneumonia). Or they may get an infection of the fluid or tissues that surround the brain and spinal cord (meningitis). If you think your baby has group B strep, get medical care right away. What causes it? The infection is sometimes caused by the mother passing the bacteria to the . The bacteria can also come from another source. Sources for late-onset infection can be hard to figure out and are often unknown. Babies who are born early (before 42 weeks) are more likely than full-term babies to get group B strep. What are the symptoms? Symptoms of group B strep may include high or low body temperature. With a low temperature, your baby's skin may feel cold and clammy. With a high temperature, the skin will feel warmer than usual. Your baby may be fussy and have lower energy. Other symptoms include vomiting, breathing quickly, and having trouble feeding.  With babies, vomiting should not be confused with spitting up. Vomiting is forceful. Spitting up may seem forceful. But it often occurs shortly after feeding. And it doesn't continue like vomiting does. How is it diagnosed? The doctor will test your baby's blood or spinal fluid or both for group B strep bacteria. How is it treated? Your baby will be treated in the hospital. Antibiotics are given to stop the infection. The medicine may be given through an intravenous (IV) needle into a vein. If your baby has trouble breathing, the doctor may use a ventilator. This machine helps your baby breathe. To do this, the doctor puts a soft tube through your baby's mouth into the windpipe. The hospital staff will give your baby the nutrition he or she needs. The doctor may feed your baby through a soft tube that goes through the nose and into the stomach. Or the doctor may use an IV that goes through the belly button to do this. When should you call for help? [id number] anytime you think your child may need emergency care. For example, call if:  · Your baby passes out (loses consciousness). · Your baby has severe trouble breathing. Symptoms may include:  ? Using the belly muscles to breathe. ? The chest sinking in or the nostrils flaring when your baby struggles to breathe. Call the doctor now or seek immediate medical care if:  · Your baby has new or worse trouble breathing. · Your baby has a rectal temperature that is less than 97.5°F (36.4°C) or is 100.4°F (38°C) or higher. Call if you can't take your baby's temperature but he or she seems hot. · Your baby feels cold and clammy. · Your baby has no wet diapers for 6 hours or has strong-smelling urine. · Your baby's coloring has changed. His or her skin may look pale or blue. Watch closely for changes in your child's health, and be sure to contact your doctor if:  · Your baby cries in an unusual way or for an unusual length of time.   · Your baby is rarely awake and does not wake up for feedings, is very fussy, seems too tired to eat, or is not interested in eating. Follow-up care is a key part of your child's treatment and safety. Be sure to make and go to all appointments, and call your doctor if your child is having problems. It's also a good idea to know your child's test results and keep a list of the medicines your child takes. Where can you learn more? Go to http://ghada-farrukh.info/  Enter [id number] in the search box to learn more about \"Learning About Group B Strep in Newborns. \"  Current as of: August 22, 2019               Content Version: 12.5  © 5369-5526 Healthwise, Incorporated. Care instructions adapted under license by [employer] (which disclaims liability or warranty for this information). If you have questions about a medical condition or this instruction, always ask your healthcare professional. [address] any warranty or liability for your use of this information.

## 2020-06-18 ENCOUNTER — ROUTINE PRENATAL (OUTPATIENT)
Dept: OBGYN CLINIC | Age: 25
End: 2020-06-18

## 2020-06-18 VITALS
BODY MASS INDEX: 33.29 KG/M2 | SYSTOLIC BLOOD PRESSURE: 116 MMHG | WEIGHT: 199.8 LBS | DIASTOLIC BLOOD PRESSURE: 76 MMHG | HEIGHT: 65 IN

## 2020-06-18 DIAGNOSIS — Z34.80 PRENATAL CARE OF MULTIGRAVIDA, ANTEPARTUM: Primary | ICD-10-CM

## 2020-06-18 LAB — GRBS, EXTERNAL: NORMAL

## 2020-06-18 NOTE — PROGRESS NOTES
[facility]  [email or web address]  [phone]    Eleuterio Johansen MD, FACOG       [facility]   FOLLOW UP OB NOTE WITH ULTRASOUND    Chief Complaint   Patient presents with    Routine Prenatal Visit       The patient reports the following concerns: none    I discussed with the patient the limitations of ultrasound and that imaging can not rule out all birth defects, chromosomal problems, or other problems with the baby. US findings:  LIMITED OB SCAN  A SINGLE VERTEX 36W2D IUP IS SEEN. FETAL CARDIAC MOTION OBSERVED. LIMITED ANATOMY WAS VISUALIZED AND APPEARS WNL. APPROPRIATE FETAL GROWTH IS SEEN. SIZE=DATES. NIRAV AND PLACENTA APPEAR WITHIN NORMAL LIMITS. Fu 1 wk  COVID screening disc, ho given  GBS ho        Physician review of ultrasound performed by technician    Today's ultrasound report and images were reviewed and discussed with the patient.   Please see images and imaging report entered by technician in PACS for more detail and progress note and diagnosis entered by MD.    Benny Jacobson MD

## 2020-06-21 LAB — B-HEM STREP SPEC QL CULT: POSITIVE

## 2020-06-25 ENCOUNTER — HOSPITAL ENCOUNTER (OUTPATIENT)
Dept: LAB | Age: 25
Discharge: HOME | End: 2020-06-25
Payer: COMMERCIAL

## 2020-06-25 ENCOUNTER — ROUTINE PRENATAL (OUTPATIENT)
Dept: OBGYN CLINIC | Age: 25
End: 2020-06-25

## 2020-06-25 VITALS
SYSTOLIC BLOOD PRESSURE: 104 MMHG | WEIGHT: 201 LBS | BODY MASS INDEX: 33.49 KG/M2 | DIASTOLIC BLOOD PRESSURE: 64 MMHG | HEIGHT: 65 IN

## 2020-06-25 DIAGNOSIS — U07.1 ASYMPTOMATIC COVID-19 VIRUS INFECTION: ICD-10-CM

## 2020-06-25 DIAGNOSIS — O36.8390 MATERNAL CARE FOR FETAL TACHYCARDIA DURING PREGNANCY: ICD-10-CM

## 2020-06-25 DIAGNOSIS — Z34.80 PRENATAL CARE OF MULTIGRAVIDA, ANTEPARTUM: Primary | ICD-10-CM

## 2020-06-25 PROCEDURE — 87635 SARS-COV-2 COVID-19 AMP PRB: CPT

## 2020-06-25 NOTE — PROGRESS NOTES
[facility]  [email or web address]  [phone]    Tracey Mackey MD, FACOG     Follow-up OB visit    Chief Complaint   Patient presents with   [facility] Routine Prenatal Visit       Patient Active Problem List    Diagnosis Date Noted    Prenatal care of multigravida, antepartum 07/10/2014    Anemia 06/17/2014    Back pain complicating pregnancy 86/19/7938    Pyelonephritis, unspecified 06/24/2012    Nausea with vomiting 06/24/2012        The patient reports the following concerns of angelita garcia contraction for the past 3 days. Vitals:    06/25/20 1332   BP: 104/64   Weight: 201 lb (91.2 kg)   Height: 5' 5\" (1.651 m)     See PN flowsheet for exam    25 y.o. 2 Oleg Dawkins 37w2d   Encounter Diagnoses   Name Primary?  Maternal care for fetal tachycardia during pregnancy     Prenatal care of multigravida, antepartum Yes       [address] BID  IOL ho given  rec covid screening 38wks  Disc normal fetal heart rate variations   [] SAB/bleeding precautions reviewed   [] PTL/PPROM precautions reviewed   [] Labor precautions reviewed   [] Fetal kick counts discussed   [] Labs reviewed with patient   [] Franklin Corraleson precautions reviewed   [] Consent reviewed   [] Handouts given to pt   [] Glucola handout    [] GBS/labor/Magic Hour handout   []    [] We reviewed CDC recommendations for Tdap for patient and close contacts and RBA of receiving in pregnancy, advised obtaining in third trimester   [] Reviewed healthy nutrition in pregnancy and good exercise practices   [] We disc safer medications in pregnancy and referred patient to [facility] KATHIE resources   [] We reviewed CDC recommendations for flu vaccine and RBA of receiving in pregnancy   []    []    []       Follow-up and Dispositions    · Return in about 1 week (around 7/2/2020) for Follow up OB visit. Orders Placed This Encounter    AMB POC FETAL NON-STRESS TEST       Tracey Mackey MD          NST procedure note    Sheldon Razo is a [id number],  25 y. o. female [facility] whose LMP  was on  who presents for fetal non-stress test.    She is 37w2d and was monitored for 29 minutes and the FHR was reactive. NST Interpretation:    FHR baseline 150 bpm, variability moderate, accelerations present, decelerations Absent. Uterine contractions were absent. Luz Marina Austin was informed of the NST results and her questions were answered.     Disposition:    - return to clinic as scheduled  X

## 2020-06-25 NOTE — PATIENT INSTRUCTIONS
Week 37 of Your Pregnancy: Care Instructions Your Care Instructions You are near the end of your pregnancyand you're probably pretty uncomfortable. It may be harder to walk around. Lying down probably isn't comfortable either. You may have trouble getting to sleep or staying asleep. Most women deliver their babies between 40 and 41 weeks. This is a good time to think about packing a bag for the hospital with items you'll need. Then you'll be ready when labor starts. Follow-up care is a key part of your treatment and safety. Be sure to make and go to all appointments, and call your doctor if you are having problems. It's also a good idea to know your test results and keep a list of the medicines you take. How can you care for yourself at home? Learn about breastfeeding · Breastfeeding is best for your baby and good for you. · Breast milk has antibodies to help your baby fight infections. · Mothers who breastfeed often lose weight faster, because making milk burns calories. · Learning the best ways to hold your baby will make breastfeeding easier. · Let your partner bathe and diaper the baby to keep your partner from feeling left out. Snuggle together when you breastfeed. · You may want to learn how to use a breast pump and store your milk. · If you choose to bottle feed, make the feeding feel like breastfeeding so you can bond with your baby. Always hold your baby and the bottle. Do not prop bottles or let your baby fall asleep with a bottle. Learn about crying · It is common for babies to cry for 1 to 3 hours a day. Some cry more, some cry less. · Babies don't cry to make you upset or because you are a bad parent. · Crying is how your baby communicates. Your baby may be hungry; have gas; need a diaper change; or feel cold, warm, tired, lonely, or tense. Sometimes babies cry for unknown reasons. · If you respond to your baby's needs, he or she will learn to trust you. · Try to stay calm when your baby cries. Your baby may get more upset if he or she senses that you are upset. Know how to care for your  · Your baby's umbilical cord stump will drop off on its own, usually between 1 and 2 weeks. To care for your baby's umbilical cord area: ? Clean the area at the bottom of the cord 2 or 3 times a day. ? Pay special attention to the area where the cord attaches to the skin. ? Keep the diaper folded below the cord. ? Use a damp washcloth or cotton ball to sponge bathe your baby until the stump has come off. · Your baby's first dark stool is called meconium. After the meconium is passed, your baby will develop his or her own bowel pattern. ? Some babies, especially  babies, have several bowel movements a day. Others have one or two a day, or one every 2 to 3 days. ?  babies often have loose, yellow stools. Formula-fed babies have more formed stools. ? If your baby's stools look like little pellets, he or she is constipated. After 2 days of constipation, call your baby's doctor. · If your baby will be circumcised, you can care for him at home. ? Gently rinse his penis with warm water after every diaper change. Do not try to remove the film that forms on the penis. This film will go away on its own. Pat dry. ? Put petroleum ointment, such as Vaseline, on the area of the diaper that will touch your baby's penis. This will keep the diaper from sticking to your baby. ? Ask the doctor about giving your baby acetaminophen (Tylenol) for pain. Where can you learn more? Go to http://ghada-farrukh.info/ Enter 68 [id number] in the search box to learn more about \"Week 37 of Your Pregnancy: Care Instructions. \" Current as of: 2020               Content Version: 12.5 © 8539-2131 Healthwise, Incorporated.   
Care instructions adapted under license by [employer] (which disclaims liability or warranty for this information). If you have questions about a medical condition or this instruction, always ask your healthcare professional. [address] any warranty or liability for your use of this information.

## 2020-06-26 LAB — SARS-COV-2, COV2NT: NOT DETECTED

## 2020-07-02 ENCOUNTER — ROUTINE PRENATAL (OUTPATIENT)
Dept: OBGYN CLINIC | Age: 25
End: 2020-07-02

## 2020-07-02 ENCOUNTER — TELEPHONE (OUTPATIENT)
Dept: OBGYN CLINIC | Age: 25
End: 2020-07-02

## 2020-07-02 VITALS — WEIGHT: 199 LBS | BODY MASS INDEX: 33.12 KG/M2 | DIASTOLIC BLOOD PRESSURE: 78 MMHG | SYSTOLIC BLOOD PRESSURE: 126 MMHG

## 2020-07-02 DIAGNOSIS — Z34.80 PRENATAL CARE OF MULTIGRAVIDA, ANTEPARTUM: ICD-10-CM

## 2020-07-02 NOTE — TELEPHONE ENCOUNTER
It is usually 24-36 hours for early discharge. Depends on time of delivery and usually depends more on the Pediatrician than me. I am usually ok for 24 hr disch if she is doing well, but baby may need longer.

## 2020-07-02 NOTE — PROGRESS NOTES
_ [facility]  [email or web address]/  [phone]    Evgeny Valljeo MD, FACOG     Follow-up OB visit    Chief Complaint   Patient presents with   [facility] Routine Prenatal Visit       Patient Active Problem List    Diagnosis Date Noted    Prenatal care of multigravida, antepartum 07/10/2014    Anemia 2014    Back pain complicating pregnancy     Pyelonephritis, unspecified 2012    Nausea with vomiting 2012        The patient reports the following concerns: none    Vitals:    20 1129   BP: 126/78   Weight: 199 lb (90.3 kg)     See PN flowsheet for exam    25 y.o.  38w2d   Encounter Diagnosis   Name Primary?  Prenatal care of multigravida, antepartum      Disc r/b/a of induction and pitocin use and increase risk of longer labor,  section, bleeding and infection. [facility] BID  FU 1 wk   [] SAB/bleeding precautions reviewed   [] PTL/PPROM precautions reviewed   [] Labor precautions reviewed   [] Fetal kick counts discussed   [] Labs reviewed with patient   [] Elina Hikes precautions reviewed   [] Consent reviewed   [] Handouts given to pt   [] Glucola handout    [] GBS/labor/Magic Hour handout   []    [] We reviewed CDC recommendations for Tdap for patient and close contacts and RBA of receiving in pregnancy, advised obtaining in third trimester   [] Reviewed healthy nutrition in pregnancy and good exercise practices   [] We disc safer medications in pregnancy and referred patient to [facility] KATHIE resources   [] We reviewed CDC recommendations for flu vaccine and RBA of receiving in pregnancy   []    []    []           No orders of the defined types were placed in this encounter.       Evgeny Vallejo MD

## 2020-07-02 NOTE — PATIENT INSTRUCTIONS

## 2020-07-02 NOTE — TELEPHONE ENCOUNTER
Call received at 2:45PM      25year old patient  36w 2d pregnant seen in the office today. Patient states she is having the baby next week and is planning. Patient is requesting early release after the baby and is wondering when she would be discharged after 24 hours? After 48 hours?     Please advise

## 2020-07-06 NOTE — PATIENT INSTRUCTIONS
Week 39 of Your Pregnancy: Care Instructions Your Care Instructions During these final weeks, you may feel anxious to see your new baby.  babies often look different from what you see in pictures or movies. Right after birth, their heads may have a strange shape. Their eyes may be puffy. And their genitals may be swollen. They may also have very dry skin, or red marks on the eyelids, nose, or neck. Still, most parents think their babies are beautiful. Follow-up care is a key part of your treatment and safety. Be sure to make and go to all appointments, and call your doctor if you are having problems. It's also a good idea to know your test results and keep a list of the medicines you take. How can you care for yourself at home? Prepare to breastfeed · If you are breastfeeding, continue to eat healthy foods. · If your health care provider recommends it, keep taking your prenatal vitamins. · Talk to your doctor before you take any medicine or supplement. That's because some medicines can affect your breast milk. · You can help prevent sore nipples if you feed your baby in the correct position. Nurses will help you learn to do this. · Your  will need to be fed about every 1 to 3 hours. Choose the right birth control after your baby is born · Women who are breastfeeding can still get pregnant. Use birth control if you don't want to get pregnant. · Intrauterine devices (IUDs) are very effective at preventing pregnancy and can provide birth control for 3 to 10 years, depending on the type. If you talk with your doctor before having your baby, the IUD can be placed right after you give birth. If you decide you want to get pregnant later, you can have it removed. They are safe to use while you are breastfeeding. · A hormonal implant is also very effective at preventing pregnancy. It is placed under the skin of the arm.  This can be done right after you give birth. It releases the hormone progestin and prevents pregnancy for about 3 years. This can also be removed by a doctor at any time. It is safe to use while you are breastfeeding. · Depo-Provera can be used while you are breastfeeding. It is a shot you get every 3 months. · Birth control pills work well. But you need a different kind of pill for the first few weeks after giving birth. And when you start taking these pills, you need to make sure to use another type of birth control for 7 days after you start your pack. · Diaphragms, cervical caps, and condoms with spermicide work less well after birth. If you have a diaphragm or cervical cap, talk to your doctor to see if you need a different size. · Tubal ligation (tying your tubes) and vasectomy are both permanent. These are good options if you are sure you are done having children. Where can you learn more? Go to http://ghada-farrukh.info/ Enter N258 in the search box to learn more about \"Week 39 of Your Pregnancy: Care Instructions. \" Current as of: May 29, 2019Content Version: 12.4 © 9419-0822 Healthwise, Incorporated. Care instructions adapted under license by [employer] (which disclaims liability or warranty for this information). If you have questions about a medical condition or this instruction, always ask your healthcare professional. [address] any warranty or liability for your use of this information.

## 2020-07-07 ENCOUNTER — ROUTINE PRENATAL (OUTPATIENT)
Dept: OBGYN CLINIC | Age: 25
End: 2020-07-07

## 2020-07-07 ENCOUNTER — HOSPITAL ENCOUNTER (INPATIENT)
Age: 25
LOS: 3 days | Discharge: HOME | End: 2020-07-10
Attending: OBSTETRICS & GYNECOLOGY | Admitting: OBSTETRICS & GYNECOLOGY
Payer: COMMERCIAL

## 2020-07-07 VITALS
HEIGHT: 65 IN | BODY MASS INDEX: 33.82 KG/M2 | WEIGHT: 203 LBS | DIASTOLIC BLOOD PRESSURE: 60 MMHG | SYSTOLIC BLOOD PRESSURE: 108 MMHG

## 2020-07-07 DIAGNOSIS — Z97.8 STATUS POST INSERTION OF FOLEY CATHETER: Primary | ICD-10-CM

## 2020-07-07 PROBLEM — Z34.90 PREGNANCY: Status: ACTIVE | Noted: 2020-07-07

## 2020-07-07 LAB
BASOPHILS # BLD: 0 K/UL (ref 0–0.1)
BASOPHILS NFR BLD: 0 % (ref 0–1)
DIFFERENTIAL METHOD BLD: ABNORMAL
EOSINOPHIL # BLD: 0.1 K/UL (ref 0–0.4)
EOSINOPHIL NFR BLD: 1 % (ref 0–7)
ERYTHROCYTE [DISTWIDTH] IN BLOOD BY AUTOMATED COUNT: 17.7 % (ref 11.5–14.5)
HCT VFR BLD AUTO: 29 % (ref 35–47)
HGB BLD-MCNC: 9 G/DL (ref 11.5–16)
IMM GRANULOCYTES # BLD AUTO: 0.1 K/UL (ref 0–0.04)
IMM GRANULOCYTES NFR BLD AUTO: 1 % (ref 0–0.5)
LYMPHOCYTES # BLD: 1.7 K/UL (ref 0.8–3.5)
LYMPHOCYTES NFR BLD: 18 % (ref 12–49)
MCH RBC QN AUTO: 25.1 PG (ref 26–34)
MCHC RBC AUTO-ENTMCNC: 31 G/DL (ref 30–36.5)
MCV RBC AUTO: 80.8 FL (ref 80–99)
MONOCYTES # BLD: 0.8 K/UL (ref 0–1)
MONOCYTES NFR BLD: 9 % (ref 5–13)
NEUTS SEG # BLD: 6.7 K/UL (ref 1.8–8)
NEUTS SEG NFR BLD: 71 % (ref 32–75)
NRBC # BLD: 0.02 K/UL (ref 0–0.01)
NRBC BLD-RTO: 0.2 PER 100 WBC
PLATELET # BLD AUTO: 174 K/UL (ref 150–400)
PMV BLD AUTO: 10.7 FL (ref 8.9–12.9)
RBC # BLD AUTO: 3.59 M/UL (ref 3.8–5.2)
WBC # BLD AUTO: 9.4 K/UL (ref 3.6–11)

## 2020-07-07 PROCEDURE — 65270000029 HC RM PRIVATE

## 2020-07-07 PROCEDURE — 75410000000 HC DELIVERY VAGINAL/SINGLE: Performed by: OBSTETRICS & GYNECOLOGY

## 2020-07-07 PROCEDURE — 74011250636 HC RX REV CODE- 250/636: Performed by: OBSTETRICS & GYNECOLOGY

## 2020-07-07 PROCEDURE — 85025 COMPLETE CBC W/AUTO DIFF WBC: CPT

## 2020-07-07 PROCEDURE — 75410000003 HC RECOV DEL/VAG/CSECN EA 0.5 HR: Performed by: OBSTETRICS & GYNECOLOGY

## 2020-07-07 PROCEDURE — 36415 COLL VENOUS BLD VENIPUNCTURE: CPT

## 2020-07-07 PROCEDURE — 75410000002 HC LABOR FEE PER 1 HR: Performed by: OBSTETRICS & GYNECOLOGY

## 2020-07-07 PROCEDURE — 76060000078 HC EPIDURAL ANESTHESIA: Performed by: NURSE ANESTHETIST, CERTIFIED REGISTERED

## 2020-07-07 PROCEDURE — 59200 INSERT CERVICAL DILATOR: CPT | Performed by: OBSTETRICS & GYNECOLOGY

## 2020-07-07 PROCEDURE — 74011000258 HC RX REV CODE- 258: Performed by: OBSTETRICS & GYNECOLOGY

## 2020-07-07 RX ORDER — SODIUM CHLORIDE 0.9 % (FLUSH) 0.9 %
5-40 SYRINGE (ML) INJECTION EVERY 8 HOURS
Status: DISCONTINUED | OUTPATIENT
Start: 2020-07-07 | End: 2020-07-08

## 2020-07-07 RX ORDER — DIPHENHYDRAMINE HCL 25 MG
50 CAPSULE ORAL
Status: DISCONTINUED | OUTPATIENT
Start: 2020-07-07 | End: 2020-07-08 | Stop reason: HOSPADM

## 2020-07-07 RX ORDER — NALOXONE HYDROCHLORIDE 0.4 MG/ML
0.4 INJECTION, SOLUTION INTRAMUSCULAR; INTRAVENOUS; SUBCUTANEOUS AS NEEDED
Status: DISCONTINUED | OUTPATIENT
Start: 2020-07-07 | End: 2020-07-07

## 2020-07-07 RX ORDER — BUTORPHANOL TARTRATE 2 MG/ML
2 INJECTION INTRAMUSCULAR; INTRAVENOUS
Status: DISCONTINUED | OUTPATIENT
Start: 2020-07-07 | End: 2020-07-10 | Stop reason: HOSPADM

## 2020-07-07 RX ORDER — NALOXONE HYDROCHLORIDE 0.4 MG/ML
0.4 INJECTION, SOLUTION INTRAMUSCULAR; INTRAVENOUS; SUBCUTANEOUS AS NEEDED
Status: DISCONTINUED | OUTPATIENT
Start: 2020-07-07 | End: 2020-07-08

## 2020-07-07 RX ORDER — OXYTOCIN/0.9 % SODIUM CHLORIDE 30/500 ML
.5-42 PLASTIC BAG, INJECTION (ML) INTRAVENOUS
Status: DISCONTINUED | OUTPATIENT
Start: 2020-07-08 | End: 2020-07-08

## 2020-07-07 RX ORDER — MAG HYDROX/ALUMINUM HYD/SIMETH 200-200-20
30 SUSPENSION, ORAL (FINAL DOSE FORM) ORAL
Status: DISCONTINUED | OUTPATIENT
Start: 2020-07-07 | End: 2020-07-08 | Stop reason: HOSPADM

## 2020-07-07 RX ORDER — SODIUM CHLORIDE, SODIUM LACTATE, POTASSIUM CHLORIDE, CALCIUM CHLORIDE 600; 310; 30; 20 MG/100ML; MG/100ML; MG/100ML; MG/100ML
125 INJECTION, SOLUTION INTRAVENOUS CONTINUOUS
Status: DISCONTINUED | OUTPATIENT
Start: 2020-07-07 | End: 2020-07-08

## 2020-07-07 RX ORDER — OXYTOCIN/0.9 % SODIUM CHLORIDE 30/500 ML
2 PLASTIC BAG, INJECTION (ML) INTRAVENOUS
Status: DISCONTINUED | OUTPATIENT
Start: 2020-07-08 | End: 2020-07-07

## 2020-07-07 RX ORDER — BUTORPHANOL TARTRATE 2 MG/ML
1 INJECTION INTRAMUSCULAR; INTRAVENOUS
Status: DISCONTINUED | OUTPATIENT
Start: 2020-07-07 | End: 2020-07-08

## 2020-07-07 RX ORDER — MAG HYDROX/ALUMINUM HYD/SIMETH 200-200-20
30 SUSPENSION, ORAL (FINAL DOSE FORM) ORAL
Status: DISCONTINUED | OUTPATIENT
Start: 2020-07-07 | End: 2020-07-08

## 2020-07-07 RX ORDER — SODIUM CHLORIDE 0.9 % (FLUSH) 0.9 %
5-40 SYRINGE (ML) INJECTION AS NEEDED
Status: DISCONTINUED | OUTPATIENT
Start: 2020-07-07 | End: 2020-07-08

## 2020-07-07 RX ADMIN — BUTORPHANOL TARTRATE 2 MG: 2 INJECTION, SOLUTION INTRAMUSCULAR; INTRAVENOUS at 19:21

## 2020-07-07 RX ADMIN — SODIUM CHLORIDE, SODIUM LACTATE, POTASSIUM CHLORIDE, AND CALCIUM CHLORIDE 125 ML/HR: 600; 310; 30; 20 INJECTION, SOLUTION INTRAVENOUS at 21:47

## 2020-07-07 RX ADMIN — SODIUM CHLORIDE 12.5 MG: 900 INJECTION, SOLUTION INTRAVENOUS at 23:40

## 2020-07-07 RX ADMIN — SODIUM CHLORIDE 12.5 MG: 900 INJECTION, SOLUTION INTRAVENOUS at 19:21

## 2020-07-07 RX ADMIN — BUTORPHANOL TARTRATE 2 MG: 2 INJECTION, SOLUTION INTRAMUSCULAR; INTRAVENOUS at 23:42

## 2020-07-07 NOTE — PROGRESS NOTES
1905: Bedside and Verbal shift change report given to YAMILA Del Cid RN (oncoming nurse) by Keena Richardson RN (offgoing nurse). Report included the following information SBAR, Kardex, Procedure Summary, Intake/Output, MAR, Accordion, Recent Results and Med Rec Status. 2017: Pt taken off EFM per Enrrique Malone MD telephone order. 2050: Pt placed back on monitor in order to have reactive NST before turning off EFM. Pt placed in left hip tilt. 2114: Pt ambulatory to bathroom. 2117: Pt back in bed. Feeling more CTX. Pt in right lateral position    0008: 5 Million units of PCN given at this time per order from Feliberto RIVERA.     0050: Pt resting quietly, no needs at this time. 4525: Pt resting quietly. Pt in right hip tilt. 0320: Pt placed in left lateral position due to variables noted on EFM.     0325: RN at bedside attempting to trace FHR.    0332: Pt turned to right lateral position in order to better trace FHR.     0436: RN at bedside to trace FHR. Pt resting comfortably, no needs at this time. 0440: Pt ambulatory to bathroom. Pt returned to bed, placed back in right lateral position. Pt has no needs at this time. 36: RN begins 500mL fluid bolus at this time. [id number] Simon MD in to see pt. Uterine catheter removed. SVE performed, pt 4/50/-3. AROM performed at this time. Pt afebrile.     0705: Bedside and Verbal shift change report given to Laura Gallego (oncoming nurse) by Larisa Waters RN (offgoing nurse). Report included the following information SBAR, Kardex, Procedure Summary, Intake/Output, MAR, Accordion, Recent Results and Med Rec Status. D: Rheumatic heart disease, [facility] MVR 1980, again in 1992, CHF, Broken bone right leg w/ brace.    I: Monitored vitals and assessed pt status.   Changed:  Running:  PRN:Miralax, zofran, oxycodone    A: A0x4. VSS. Afebrile. Pressures are lower. 1 unit RBC's ordered, but not ready yet. Pt has positive antibodies. Up with assist 2 and walker. Up with therapy. Generalized, RLE/LLE, scrotal edema. Push bumex BID. Adequate urine output. Constipated, PRN orders changed to scheduled. 1 Episode of emesis. Zofran given. Headache throughout day.     P: Continue to monitor Pt status and report changes to treatment team.

## 2020-07-07 NOTE — PROGRESS NOTES
16:30- Pt resting in bed comfortably. C/o cramping but declines pain medicine at this time. Pt remains on EFM after cook catheter placed in office. Plan to start atx at 00:00 and pitocin at 05:00 per MD EMELY Dao. Plan to also get pt epidural at 05:00 when starting pitocin per MD.    17:19- Pt back to bed after using BR to void. RN adjusting US to trace FHT. Pt still cramping but continues to decline pain medicine at this time    18:43- VORB from MD Leslie for stadol 2mg IV Q4hr PRN and phenergan 12.5mg IV Q4 hrs PRN    19:00- Bedside and Verbal shift change report given to EVIE Gaston (oncoming nurse) by Aimee Louis (offgoing nurse). Report included the following information SBAR, Procedure Summary, Intake/Output, MAR, Accordion and Recent Results.

## 2020-07-07 NOTE — H&P
History & Physical    Name: Richard Yin MRN: [id number]  SSN: [id number]    YOB: 1995  Age: 25 y.o. Sex: female        Subjective:     Estimated Date of Delivery: 20  OB History        4    Para   1    Term   1            AB   2    Living   1       SAB        TAB   2    Ectopic        Molar        Multiple        Live Births   1          Obstetric Comments   2014 IOL             Ms. Wilmer Gomes is admitted with pregnancy at 39w0d for induction of labor. Prenatal course was normal. Please see prenatal records for details. Past Medical History:   Diagnosis Date    Anemia     HGSIL (high grade squamous intraepithelial lesion) on Pap smear of cervix 2019    Pyelonephritis, unspecified 2012    Routine Papanicolaou smear 16 neg    Scoliosis     scoliosis     Past Surgical History:   Procedure Laterality Date    HX CHOLECYSTECTOMY  2016    HX COLPOSCOPY  2020    HX LAP CHOLECYSTECTOMY      HX OTHER SURGICAL  2016    Ureteroscopy     Social History     Occupational History    Not on file   Tobacco Use    Smoking status: Former Smoker    Smokeless tobacco: Former User     Quit date: 2019   Substance and Sexual Activity    Alcohol use: Not Currently    Drug use: No    Sexual activity: Yes     Partners: Male     Birth control/protection: None     Family History   Problem Relation Age of Onset    Asthma Mother     Diabetes Father        No Known Allergies  Prior to Admission medications    Medication Sig Start Date End Date Taking? Authorizing Provider   ferrous sulfate (Iron) 325 mg (65 mg iron) tablet Take 325 mg by mouth Daily (before breakfast). Yes Provider, Historical   omeprazole (PRILOSEC) 20 mg capsule Take 20 mg by mouth daily. Yes Provider, Historical   PNV SN.19/GAHBBUF fum/folic ac (PRENATAL PO) Take  by mouth.    Yes Provider, [facility] [place] Problems    Diagnosis Date Noted    Pregnancy 2020 Review of Systems: A comprehensive review of systems was negative except for that written in the HPI. Constitutional: negative for fevers, chills and weight loss  ENT ROS: negative for - hearing change, oral lesions or visual changes  Respiratory: negative for cough, wheezing or dyspnea on exertion  Cardiovascular: negative for chest pain, irregular heart beats, exertional chest pressure/discomfort  Gastrointestinal: negative for dysphagia, nausea and vomiting  Genito-Urinary ROS: see HPI  Inteument/breast: negative for rash, breast lump and nipple discharge  Musculoskeletal:negative for stiff joints, neck pain and muscle weakness  Endocrine ROS: negative for - breast changes, galactorrhea or temperature intolerance  Hematological and Lymphatic ROS: negative for - bruising or swollen lymph nodes          Objective:     Vitals:  Vitals:    07/07/20 1813 07/07/20 1818 07/07/20 1823 07/07/20 1828   BP:       Pulse:       Resp:       Temp:       SpO2: 95% 95% 95% 96%   Weight:       Height:              Physical Exam:  Patient without distress.   Heart: Regular rate and rhythm or S1S2 present  Lung: clear to auscultation throughout lung fields, no wheezes, no rales, no rhonchi and normal respiratory effort  Abdomen: soft, nontender  Fundus: soft and non tender  Cervical Exam: 30/1.5/-3 (in office)  Lower Extremities: no c/t/e    Membranes:  Intact  Fetal Heart Rate: Reactive  Baseline: 120 per minute  Variability: moderate  Accelerations: yes  Decelerations: none  TOCO: q1-3min    Prenatal Labs:   Lab Results   Component Value Date/Time    Rubella, External Immune 12/23/2019    GrBStrep, External Postive 06/18/2020    HBsAg, External Negative 12/23/2019    HIV, External Negative 12/23/2019    Gonorrhea, External Negative 11/25/2019    Chlamydia, External Negative 11/25/2019        WBC   Date Value Ref Range Status   07/07/2020 9.4 3.6 - 11.0 K/uL Final     RBC   Date Value Ref Range Status   07/07/2020 3.59 (L) 3.80 - 5.20 M/uL Final     HGB   Date Value Ref Range Status   2020 9.0 (L) 11.5 - 16.0 g/dL Final     HCT   Date Value Ref Range Status   2020 29.0 (L) 35.0 - 47.0 % Final     PLATELET   Date Value Ref Range Status   2020 174 150 - 400 K/uL Final     Hgb, External   Date Value Ref Range Status   2020 9.4  Final     Hct, External   Date Value Ref Range Status   2020 30.7  Final     Platelet cnt., External   Date Value Ref Range Status   2020 170  Final         Assessment/Plan:   25 y.o.  39w0d  IOL   The patient does have anemia  GBS Positive  Reassuring fetal surveillance    Plan: Admit for IOL. CEFM/TOCO  Pitocin in am  PCN with labor or at midnight. Anticipate     The patient was counseled at length about the risks of yudelka Covid-19 during their postpartum/labor period and any recovery window from their procedure. The patient was made aware that yudelka Covid-19  may worsen their prognosis for recovering from their procedure and lend to a higher morbidity and/or mortality risk. All material risks, benefits, and reasonable alternatives including postponing the IOL were discussed. The patient does  wish to proceed with the IOL at this time.           Signed By:  Vannessa Peterson MD     2020

## 2020-07-07 NOTE — PROGRESS NOTES
Suzan Stephens MD, FACOG       Chart reviewed for the following:   Renate CONNELLY, have reviewed the pertinent history and updated the medications and allergic reactions for [facility]. TIME OUT performed immediately prior to start of procedure:   Renate CONNELLY, have performed the following reviews on [facility] prior to the start of the procedure:            * Patient was identified by name and date of birth   * Agreement on procedure being performed was verified  * Risks and Benefits explained to the patient  * Procedure site verified and marked as necessary  * Patient was positioned for comfort  * Consent was signed and verified     Time: 3:05    Date of procedure: 7/7/2020    Procedure performed by: Susie Parada MD    Provider assisted by: Fareed Womack MA    Patient assisted by: self    How tolerated by patient: tolerated the procedure well with no complications    Comments: none    I was present for the entire procedure and agree with the above attestation. Misty Robb MD      Cervical Morelos insertion Procedure Note    [facility] is a [id number], 25 y.o. 39w0d who presents today far placement of a morelos catheter in the cervix for ripening. Her cervix is unfavorable and she is scheduled for induction tomorrow. She has elected to have a cervical morelos catheter placement today. The risks, benefits and assets of the procedure were discussed. Her questions were answered. PROCEDURE: The morelos catheter was prepped with Betadine. A 60 Setswana morelos catheter was placed through the cervix without difficulty. The bulb was inflated with 60 cc of saline. Bleeding was minimal. The patient's level of discomfort was minimal.    POST PROCEDURE: The patient tolerated the procedure well. There were no complications. She was observed for 10 minutes. Report was called to Labor and Delivery and she was admitted for induction of labor.     She was instructed to check in to Labor and Delivery upon discharge from the office.

## 2020-07-08 ENCOUNTER — OFFICE VISIT (OUTPATIENT)
Dept: OBGYN CLINIC | Age: 25
End: 2020-07-08

## 2020-07-08 ENCOUNTER — ANESTHESIA (OUTPATIENT)
Dept: LABOR AND DELIVERY | Age: 25
End: 2020-07-08
Payer: COMMERCIAL

## 2020-07-08 ENCOUNTER — ANESTHESIA EVENT (OUTPATIENT)
Dept: LABOR AND DELIVERY | Age: 25
End: 2020-07-08
Payer: COMMERCIAL

## 2020-07-08 LAB
ATRIAL RATE: 94 BPM
CALCULATED P AXIS, ECG09: 53 DEGREES
CALCULATED R AXIS, ECG10: 10 DEGREES
CALCULATED T AXIS, ECG11: 22 DEGREES
DIAGNOSIS, 93000: NORMAL
P-R INTERVAL, ECG05: 156 MS
Q-T INTERVAL, ECG07: 374 MS
QRS DURATION, ECG06: 84 MS
QTC CALCULATION (BEZET), ECG08: 467 MS
VENTRICULAR RATE, ECG03: 94 BPM

## 2020-07-08 PROCEDURE — 74011000250 HC RX REV CODE- 250: Performed by: ANESTHESIOLOGY

## 2020-07-08 PROCEDURE — 74011250636 HC RX REV CODE- 250/636: Performed by: OBSTETRICS & GYNECOLOGY

## 2020-07-08 PROCEDURE — 77030005513 HC CATH URETH FOL11 MDII -B

## 2020-07-08 PROCEDURE — 75410000002 HC LABOR FEE PER 1 HR: Performed by: OBSTETRICS & GYNECOLOGY

## 2020-07-08 PROCEDURE — 94760 N-INVAS EAR/PLS OXIMETRY 1: CPT

## 2020-07-08 PROCEDURE — 74011250637 HC RX REV CODE- 250/637: Performed by: OBSTETRICS & GYNECOLOGY

## 2020-07-08 PROCEDURE — 74011000250 HC RX REV CODE- 250

## 2020-07-08 PROCEDURE — 93005 ELECTROCARDIOGRAM TRACING: CPT

## 2020-07-08 PROCEDURE — 74011000258 HC RX REV CODE- 258: Performed by: OBSTETRICS & GYNECOLOGY

## 2020-07-08 PROCEDURE — 3E033VJ INTRODUCTION OF OTHER HORMONE INTO PERIPHERAL VEIN, PERCUTANEOUS APPROACH: ICD-10-PCS | Performed by: OBSTETRICS & GYNECOLOGY

## 2020-07-08 PROCEDURE — 65270000029 HC RM PRIVATE

## 2020-07-08 PROCEDURE — 77030014125 HC TY EPDRL BBMI -B: Performed by: NURSE ANESTHETIST, CERTIFIED REGISTERED

## 2020-07-08 PROCEDURE — 0KQM0ZZ REPAIR PERINEUM MUSCLE, OPEN APPROACH: ICD-10-PCS | Performed by: OBSTETRICS & GYNECOLOGY

## 2020-07-08 PROCEDURE — 10907ZC DRAINAGE OF AMNIOTIC FLUID, THERAPEUTIC FROM PRODUCTS OF CONCEPTION, VIA NATURAL OR ARTIFICIAL OPENING: ICD-10-PCS | Performed by: OBSTETRICS & GYNECOLOGY

## 2020-07-08 PROCEDURE — 74011250636 HC RX REV CODE- 250/636: Performed by: ANESTHESIOLOGY

## 2020-07-08 PROCEDURE — 74011000250 HC RX REV CODE- 250: Performed by: NURSE ANESTHETIST, CERTIFIED REGISTERED

## 2020-07-08 RX ORDER — FENTANYL/BUPIVACAINE/NS/PF 2-1250MCG
1-16 PREFILLED PUMP RESERVOIR EPIDURAL CONTINUOUS
Status: DISCONTINUED | OUTPATIENT
Start: 2020-07-08 | End: 2020-07-08

## 2020-07-08 RX ORDER — IBUPROFEN 800 MG/1
800 TABLET ORAL EVERY 8 HOURS
Status: DISCONTINUED | OUTPATIENT
Start: 2020-07-08 | End: 2020-07-08

## 2020-07-08 RX ORDER — IBUPROFEN 800 MG/1
800 TABLET ORAL EVERY 8 HOURS
Status: DISCONTINUED | OUTPATIENT
Start: 2020-07-08 | End: 2020-07-10 | Stop reason: HOSPADM

## 2020-07-08 RX ORDER — ONDANSETRON 4 MG/1
4 TABLET, ORALLY DISINTEGRATING ORAL
Status: ACTIVE | OUTPATIENT
Start: 2020-07-08 | End: 2020-07-09

## 2020-07-08 RX ORDER — NALOXONE HYDROCHLORIDE 0.4 MG/ML
0.4 INJECTION, SOLUTION INTRAMUSCULAR; INTRAVENOUS; SUBCUTANEOUS AS NEEDED
Status: DISCONTINUED | OUTPATIENT
Start: 2020-07-08 | End: 2020-07-10 | Stop reason: HOSPADM

## 2020-07-08 RX ORDER — DOCUSATE SODIUM 100 MG/1
100 CAPSULE, LIQUID FILLED ORAL
Status: DISCONTINUED | OUTPATIENT
Start: 2020-07-08 | End: 2020-07-10 | Stop reason: HOSPADM

## 2020-07-08 RX ORDER — ACETAMINOPHEN 325 MG/1
650 TABLET ORAL
Status: DISCONTINUED | OUTPATIENT
Start: 2020-07-08 | End: 2020-07-10 | Stop reason: HOSPADM

## 2020-07-08 RX ORDER — ACETAMINOPHEN 325 MG/1
650 TABLET ORAL
Status: DISCONTINUED | OUTPATIENT
Start: 2020-07-08 | End: 2020-07-08 | Stop reason: SDUPTHER

## 2020-07-08 RX ORDER — MORPHINE SULFATE 4 MG/ML
INJECTION INTRAVENOUS
Status: DISCONTINUED
Start: 2020-07-08 | End: 2020-07-08 | Stop reason: WASHOUT

## 2020-07-08 RX ORDER — OXYCODONE AND ACETAMINOPHEN 5; 325 MG/1; MG/1
1 TABLET ORAL
Status: DISCONTINUED | OUTPATIENT
Start: 2020-07-08 | End: 2020-07-08

## 2020-07-08 RX ORDER — DIPHENHYDRAMINE HCL 25 MG
25 CAPSULE ORAL
Status: DISCONTINUED | OUTPATIENT
Start: 2020-07-08 | End: 2020-07-10 | Stop reason: HOSPADM

## 2020-07-08 RX ORDER — MORPHINE SULFATE 4 MG/ML
4 INJECTION INTRAVENOUS ONCE
Status: DISCONTINUED | OUTPATIENT
Start: 2020-07-08 | End: 2020-07-08

## 2020-07-08 RX ORDER — SIMETHICONE 80 MG
80 TABLET,CHEWABLE ORAL
Status: DISCONTINUED | OUTPATIENT
Start: 2020-07-08 | End: 2020-07-10 | Stop reason: HOSPADM

## 2020-07-08 RX ORDER — BUPIVACAINE HYDROCHLORIDE 2.5 MG/ML
INJECTION, SOLUTION EPIDURAL; INFILTRATION; INTRACAUDAL AS NEEDED
Status: DISCONTINUED | OUTPATIENT
Start: 2020-07-08 | End: 2020-07-08 | Stop reason: HOSPADM

## 2020-07-08 RX ORDER — CALCIUM CARBONATE 200(500)MG
200 TABLET,CHEWABLE ORAL
Status: DISCONTINUED | OUTPATIENT
Start: 2020-07-08 | End: 2020-07-10 | Stop reason: HOSPADM

## 2020-07-08 RX ORDER — EPHEDRINE SULFATE/0.9% NACL/PF 50 MG/5 ML
SYRINGE (ML) INTRAVENOUS
Status: DISPENSED
Start: 2020-07-08 | End: 2020-07-08

## 2020-07-08 RX ORDER — HYDROCORTISONE ACETATE PRAMOXINE HCL 2.5; 1 G/100G; G/100G
CREAM TOPICAL AS NEEDED
Status: DISCONTINUED | OUTPATIENT
Start: 2020-07-08 | End: 2020-07-10 | Stop reason: HOSPADM

## 2020-07-08 RX ORDER — OXYTOCIN/0.9 % SODIUM CHLORIDE 20/1000 ML
125-500 PLASTIC BAG, INJECTION (ML) INTRAVENOUS ONCE
Status: ACTIVE | OUTPATIENT
Start: 2020-07-08 | End: 2020-07-09

## 2020-07-08 RX ORDER — LIDOCAINE HYDROCHLORIDE AND EPINEPHRINE 20; 5 MG/ML; UG/ML
INJECTION, SOLUTION EPIDURAL; INFILTRATION; INTRACAUDAL; PERINEURAL AS NEEDED
Status: DISCONTINUED | OUTPATIENT
Start: 2020-07-08 | End: 2020-07-08 | Stop reason: HOSPADM

## 2020-07-08 RX ORDER — HYDROCODONE BITARTRATE AND ACETAMINOPHEN 5; 325 MG/1; MG/1
1 TABLET ORAL
Status: DISCONTINUED | OUTPATIENT
Start: 2020-07-08 | End: 2020-07-10 | Stop reason: HOSPADM

## 2020-07-08 RX ORDER — IBUPROFEN 600 MG/1
600 TABLET ORAL
Qty: 30 TAB | Refills: 0 | Status: SHIPPED | OUTPATIENT
Start: 2020-07-08

## 2020-07-08 RX ORDER — LIDOCAINE HYDROCHLORIDE 10 MG/ML
INJECTION INFILTRATION; PERINEURAL
Status: COMPLETED
Start: 2020-07-08 | End: 2020-07-08

## 2020-07-08 RX ORDER — NALBUPHINE HYDROCHLORIDE 10 MG/ML
2.5 INJECTION, SOLUTION INTRAMUSCULAR; INTRAVENOUS; SUBCUTANEOUS
Status: DISCONTINUED | OUTPATIENT
Start: 2020-07-08 | End: 2020-07-08

## 2020-07-08 RX ADMIN — SODIUM CHLORIDE, SODIUM LACTATE, POTASSIUM CHLORIDE, AND CALCIUM CHLORIDE 125 ML/HR: 600; 310; 30; 20 INJECTION, SOLUTION INTRAVENOUS at 14:19

## 2020-07-08 RX ADMIN — Medication 12 ML/HR: at 15:33

## 2020-07-08 RX ADMIN — Medication 12 ML/HR: at 08:21

## 2020-07-08 RX ADMIN — OXYTOCIN 2 MILLI-UNITS/MIN: 10 INJECTION, SOLUTION INTRAMUSCULAR; INTRAVENOUS at 07:21

## 2020-07-08 RX ADMIN — ACETAMINOPHEN 650 MG: 325 TABLET ORAL at 11:05

## 2020-07-08 RX ADMIN — BUPIVACAINE HYDROCHLORIDE 10 ML: 2.5 INJECTION, SOLUTION EPIDURAL; INFILTRATION; INTRACAUDAL; PERINEURAL at 08:10

## 2020-07-08 RX ADMIN — SODIUM CHLORIDE 2.5 MILLION UNITS: 900 INJECTION, SOLUTION INTRAVENOUS at 12:36

## 2020-07-08 RX ADMIN — SODIUM CHLORIDE, SODIUM LACTATE, POTASSIUM CHLORIDE, AND CALCIUM CHLORIDE 1000 ML: 600; 310; 30; 20 INJECTION, SOLUTION INTRAVENOUS at 07:30

## 2020-07-08 RX ADMIN — SODIUM CHLORIDE 2.5 MILLION UNITS: 900 INJECTION, SOLUTION INTRAVENOUS at 08:25

## 2020-07-08 RX ADMIN — SODIUM CHLORIDE 5 MILLION UNITS: 900 INJECTION, SOLUTION INTRAVENOUS at 00:08

## 2020-07-08 RX ADMIN — OXYCODONE HYDROCHLORIDE AND ACETAMINOPHEN 1 TABLET: 5; 325 TABLET ORAL at 19:45

## 2020-07-08 RX ADMIN — SODIUM CHLORIDE 2.5 MILLION UNITS: 900 INJECTION, SOLUTION INTRAVENOUS at 16:49

## 2020-07-08 RX ADMIN — SODIUM CHLORIDE 2.5 MILLION UNITS: 900 INJECTION, SOLUTION INTRAVENOUS at 04:01

## 2020-07-08 RX ADMIN — LIDOCAINE HYDROCHLORIDE 17 ML: 10 INJECTION, SOLUTION INFILTRATION; PERINEURAL at 19:06

## 2020-07-08 RX ADMIN — IBUPROFEN 800 MG: 800 TABLET ORAL at 19:45

## 2020-07-08 RX ADMIN — LIDOCAINE HYDROCHLORIDE,EPINEPHRINE BITARTRATE 6 ML: 20; .005 INJECTION, SOLUTION EPIDURAL; INFILTRATION; INTRACAUDAL; PERINEURAL at 16:56

## 2020-07-08 NOTE — PROGRESS NOTES
Labor Progress Note    Patient seen, fetal heart rate and contraction pattern evaluated. Physical Exam:  Visit Vitals  /61   Pulse (!) 101   Temp 99.1 °F (37.3 °C)   Resp 16   Ht 5' 5\" (1.651 m)   Wt 203 lb (92.1 kg)   SpO2 99%   BMI 33.78 kg/m²       Cervical Exam: Cervical Exam  Membrane Status: Intact    Levi removed after deflated  50/4/-3 post soft      Membranes:  Artificial Rupture of Membranes; Amniotic Fluid: medium amount of clear fluid    FSE placed for improved fetal monitoring   Assessment/Plan:  25 y.o.  39w1d     Reassuring fetal status. Anticipate   CEFM/TOCO    Trish Armas MD      NST Inpatient Procedure Note    Riana Herrera presents for fetal non-stress test.    Indication is labor. She is 39w1d. She has been monitored for more than 60 minutes. The FHR was reactive. NST Interpretation:   FHR baseline 140 bpm,   Variability moderate  Accelerations present. Decelerations Absent. Uterine contractions were irregular     Assessment  NST is reactive. NST is reassuring.  25 y.o.  39w1d  IOL for elective/moving out of town  Patient does need admission/observation for further monitoring.       Plan:    [x] Continue admission to labor and delivery    [] Continue observation   [] Keep routine OB follow up upon discharge   [] Reviewed fetal movement kick counts, notify MD if decreased   []      begin pitocin when staffing available and titrate per protocol while RFS

## 2020-07-08 NOTE — PROGRESS NOTES
Pt c/o inc contraction pain. Visit Vitals  /60   Pulse 95   Temp 98.2 °F (36.8 °C)   Resp 18   Ht 5' 5\" (1.651 m)   Wt 203 lb (92.1 kg)   SpO2 94%   BMI 33.78 kg/m²     Cervical Exam  Dilation (cm): 6  Eff: 50 %  Station: 0  Vaginal exam done by? : Feliberto  Membrane Status: AROM    Fse/iupc in place    25 y.o.  39w1d   Active labor    Continue pitocin to maintain adequate labor while RFS  Dose epidural prn  Anticipate     NST Inpatient Procedure Note    Trendyoung Hyman presents for fetal non-stress test.    Indication is labor/iol. She is 39w1d. She has been monitored for more than 60 minutes. The FHR was reactive. NST Interpretation:   FHR baseline 130-135 bpm,   Variability moderate  Accelerations present. Decelerations Absent. Uterine contractions were q3min     Assessment  NST is reactive. NST is reassuring. Patient does need admission/observation for further monitoring. Charity Feng was informed of the NST results and her questions were answered.     Plan:    [x] Continue admission to labor and delivery    [] Continue observation   [] Keep routine OB follow up upon discharge   [] Reviewed fetal movement kick counts, notify MD if decreased   []    []

## 2020-07-08 NOTE — PROGRESS NOTES
07:43- MD Feliberto notified of irregular rhythm noted on assessment. Pt asymptomatic. RN asks if MD would like to evaluate with EKG. MD states to keep pulse ox on pt so we can monitor HR and she will evaluate when she comes to round. 07:44- FSE cord detached from leg sticker. RN reconnects and FHR tracing normally. 08:05- Time out for epidural  08:18- Pt assisted to supine position after epidural catheter placed. LUIS [place] dosing epidural. [place] adjusted BP cycling Q2 min  09:50- Pt asleep in bed. Family at bedside. Pitocin increased. RN will continue to monitor. 10:20- Pt asleep in bed. RN at bedside to increase pitocin. Levi emptied. Family remains at bedside  11:00- MD Feliberto notified of 100F temp. Also notified of recent late decel that occurred after period of tachysystole which resolved. TORB for 650mg tylenol now. MD will be down to bedside later to assess labor progression and heart rate/rhythm. 11:53- MD Feliberto at bedside performing SVE when RN arrives to room. Pt 5/50. MD requests IUPC and places. RN will continue to monitor ctx and titrate pitocin  12:30- RN performs 12-lead EKG at bedside per MD order. SR with PVCs. Will notify MD. PT remains supine as she is comfortable. Pt educated RN may need to turn pt is baby doesn't tolerate position  12:45- Feliberto sent perfect serve message informing EKG done and results SR with PVCs  16:46- Pt with increased pain to right groin. Turned to right side and encourgaed use of PCA with epidural. Will reassess in 10 min. If no change will call anesthesia to bolus. 16:57- MD Carlos at bedside to redose epidural. MD states will take approx 10-15 min for full effect. Pt turned more to right side as pain is felt more in right groin  17:12- MVUs greater than 250 over 30 min. Pitocin decreased by 2 milliunits  17:42- MVUs greater than 300, pitocin decreased by another 2 milliunits. Pt remains on right side, pt states pain has improved and is tolerable. 18:06- Feliberto notified via perfect serve pt 10/100/+1. Awaiting orders to begin pushing  18:18- MD Feliberto calls back. TORB to start pushing  18:25- Begins pushing  18:46- FSE not tracing FHR adequately, RN replaces external US  18:47- Pt delivers viable infant male via . 18:54- Placenta delivered, membranes retained. MD performing manual sweep to remove membranes. 19:00- Bedside and Verbal shift change report given to LISA River RN (oncoming nurse) by [place]. Mildred Lane (offgoing nurse). Report included the following information SBAR, Procedure Summary, Intake/Output, MAR, Accordion and Recent Results.

## 2020-07-08 NOTE — ANESTHESIA PREPROCEDURE EVALUATION
Relevant Problems   No relevant active problems       Anesthetic History     PONV          Review of Systems / Medical History  Patient summary reviewed, nursing notes reviewed and pertinent labs reviewed    Pulmonary  Within defined limits                 Neuro/Psych   Within defined limits           Cardiovascular                    Comments: No  history of murmur per patient but RN was able to hear a transient murmur. GI/Hepatic/Renal     GERD           Endo/Other  Within defined limits           Other Findings              Physical Exam    Airway  Mallampati: III  TM Distance: 4 - 6 cm  Neck ROM: normal range of motion        Cardiovascular    Rhythm: regular  Rate: normal    Murmur, Pulmonic area    Comments: Murmur is transient Dental  No notable dental hx       Pulmonary  Breath sounds clear to auscultation               Abdominal         Other Findings            Anesthetic Plan    ASA: 2  Anesthesia type: epidural                Risks including headache, backache, failure to work and need for replacement, infection and nerve damage discussed with pt. Pt chooses to proceed with epidural. Consent signed. Note entered after procedure.

## 2020-07-08 NOTE — PROGRESS NOTES
Pt doing well co pressure. Visit Vitals  BP 99/44   Pulse 96   Temp 99.6 °F (37.6 °C)   Resp 18   Ht 5' 5\" (1.651 m)   Wt 203 lb (92.1 kg)   SpO2 94%   BMI 33.78 kg/m²       gen nad  Cor irreg heart rate intermittent  pulm cta     Cervical Exam  Dilation (cm): 5  Eff: 50 %  Station: -3  Vaginal exam done by? : MD Feliberto  Membrane Status: AROM    IUPC placed for improved monitoring of UC/MVU    25 y.o.  39w1d   Irregular maternal heart rate    FSE/IUPC  Anticipate   EKG  Titrate pitocin while RFS      NST Inpatient Procedure Note    Jessie Cartagena presents for fetal non-stress test.    Indication is labor, iol. She is 39w1d. She has been monitored for more than 60 minutes. The FHR was reactive. NST Interpretation:   FHR baseline 130 bpm,   Variability moderate  Accelerations present. Decelerations Absent. Uterine contractions were q 3-4 minutes     Assessment  NST is reactive. NST is reassuring. Patient does need admission/observation for further monitoring. Ashvin Taylor was informed of the NST results and her questions were answered.     Plan:    [] Continue admission to labor and delivery    [] Continue observation   [] Keep routine OB follow up upon discharge   [] Reviewed fetal movement kick counts, notify MD if decreased   []    []

## 2020-07-08 NOTE — PROGRESS NOTES
Pt comfortable. Reviewed EKG: no sx.   Disc pp fu.    25 y.o. Mago Bolds 39w1d   Anticipate     Alberto Mccormack MD

## 2020-07-08 NOTE — ANESTHESIA PROCEDURE NOTES
Epidural Block    Start time: 7/8/2020 8:01 AM  End time: 7/8/2020 8:09 AM  Performed by: Rae Thurman CRNA  Authorized by: Kelsea Reyes MD     Pre-Procedure  Indication: labor epidural    Preanesthetic Checklist: patient identified, risks and benefits discussed, anesthesia consent, timeout performed and anesthesia consent    Timeout Time: 08:02        Epidural:   Patient position:  Seated  Prep region:  Lumbar  Prep: Betadine    Location:  L3-4    Needle and Epidural Catheter:   Needle Type:  Tuohy  Needle Gauge:  17 G  Injection Technique:  Loss of resistance using air  Attempts:  1  Catheter Size:  18 G  Catheter at Skin Depth (cm):  10  Depth in Epidural Space (cm):  5  Events: no blood with aspiration, no cerebrospinal fluid with aspiration, no paresthesia and negative aspiration test    Test Dose:  Lidocaine 1.5% w/ epi and negative    Assessment:   Catheter Secured:  Tegaderm and tape  Insertion:  Uncomplicated  Patient tolerance:  Patient tolerated the procedure well with no immediate complications  Epidural easily placed x1 attempt  NORMA at 5 cm  Catheter easily threaded to 10 cm  Neg heme, neg paresthesia, neg heme    Pt had a very difficult time not moving during epidural placement due to being extremely ticklish

## 2020-07-08 NOTE — PROGRESS NOTES
1900- Assumed care of patient. Bedside report received. Pt skin to skin with infant. MD repairing laceration. 1945- Medicated with Motrin and Percocet. 2015- Resting in bed eating. Father skin to skin with infant. 2110- Ambulatory to bathroom with steady gait. No needs at this time.

## 2020-07-09 PROCEDURE — 74011250637 HC RX REV CODE- 250/637: Performed by: OBSTETRICS & GYNECOLOGY

## 2020-07-09 PROCEDURE — 65270000029 HC RM PRIVATE

## 2020-07-09 RX ADMIN — IBUPROFEN 800 MG: 800 TABLET ORAL at 11:44

## 2020-07-09 RX ADMIN — MUPIROCIN: 20 OINTMENT TOPICAL at 20:04

## 2020-07-09 RX ADMIN — HYDROCODONE BITARTRATE AND ACETAMINOPHEN 1 TABLET: 5; 325 TABLET ORAL at 04:04

## 2020-07-09 RX ADMIN — HYDROCODONE BITARTRATE AND ACETAMINOPHEN 1 TABLET: 5; 325 TABLET ORAL at 20:04

## 2020-07-09 RX ADMIN — IBUPROFEN 800 MG: 800 TABLET ORAL at 20:05

## 2020-07-09 RX ADMIN — IBUPROFEN 800 MG: 800 TABLET ORAL at 04:04

## 2020-07-09 RX ADMIN — HYDROCODONE BITARTRATE AND ACETAMINOPHEN 1 TABLET: 5; 325 TABLET ORAL at 14:37

## 2020-07-09 RX ADMIN — HYDROCODONE BITARTRATE AND ACETAMINOPHEN 1 TABLET: 5; 325 TABLET ORAL at 00:34

## 2020-07-09 RX ADMIN — DOCUSATE SODIUM 100 MG: 100 CAPSULE, LIQUID FILLED ORAL at 11:44

## 2020-07-09 NOTE — L&D DELIVERY NOTE
Delivery Summary    Patient: Patrizia Busby MRN: [id number]  SSN: [id number]    YOB: 1995  Age: 25 y.o.   Sex: female       Information for the patient's :  Mario Yin, Male Misty Billingsley [[id number]]       Labor Events:    Labor: No    Steroids: None   Cervical Ripening Date/Time: 2020 3:05 PM   Cervical Ripening Type: Levi/EASI   Antibiotics During Labor: Yes   Rupture Identifier:      Rupture Date/Time: 2020 6:45 AM   Rupture Type: AROM   Amniotic Fluid Volume:      Amniotic Fluid Description: Clear    Amniotic Fluid Odor:      Induction: Oxytocin       Induction Date/Time: 2020 7:21 AM    Indications for Induction: Elective    Augmentation: AROM   Augmentation Date/Time: 87755:98 AM   Indications for Augmentation: Ineffective Contraction Pattern   Labor complications: None       Additional complications:        Delivery Events:  Indications For Episiotomy:     Episiotomy: None   Perineal Laceration(s): 2nd   Repaired: Yes   Periurethral Laceration Location:      Repaired:     Labial Laceration Location:     Repaired:     Sulcal Laceration Location:     Repaired:     Vaginal Laceration Location:     Repaired:     Cervical Laceration Location:     Repaired:     Repair Suture: Vicryl 3-0   Number of Repair Packets: 1   Estimated Blood Loss (ml):  ml   Quantitative Blood Loss (ml)                Delivery Date: 2020    Delivery Time: 6:47 PM  Delivery Type: Vaginal, Spontaneous  Sex:  Male    Gestational Age: 36w3d   Delivery Clinician:  Annamaria Paula  Living Status: Living   Delivery Location: L&D 206          APGARS  One minute Five minutes Ten minutes   Skin color: 1   1        Heart rate: 2   2        Grimace: 2   2        Muscle tone: 2   2        Breathin   2        Totals: 9   9            Presentation: Vertex    Position: Left Occiput Anterior  Resuscitation Method:  Tactile Stimulation;Suctioning-bulb     Meconium Stained: None      Cord Information: 3 Vessels  Complications: None  Cord around:    Delayed cord clamping? Yes  Cord clamped date/time:2020  6:48 PM  Disposition of Cord Blood: Lab    Blood Gases Sent?: No    Placenta:  Date/Time: 2020  6:54 PM  Removal: Expressed      Appearance: Normal;Intact      Measurements:  Birth Weight: 8 lb 12.9 oz (3.995 kg)      Birth Length: 1' 9\" (0.533 m)      Head Circumference:        Chest Circumference:       Abdominal Girth: Other Providers:   NOELLE GUZMAN;JOSELINE CHU;TYLER MICHELLE;EDSON TOBIAS;TANIYA ARREAGA, Obstetrician;Primary Nurse;Primary  Nurse;Nursery Nurse;Charge Nurse           Group B Strep:   Lab Results   Component Value Date/Time    Chantaltrep, External Postive 2020     Information for the patient's :  Makeda Soriano [facility] [[id number]]   No results found for: ABORH, PCTABR, PCTDIG, BILI, ABORHEXT, ABORH    No results for input(s): PCO2CB, PO2CB, HCO3I, SO2I, IBD, PTEMPI, SPECTI, PHICB, ISITE, IDEV, IALLEN in the last 72 hours. See delivery note.

## 2020-07-09 NOTE — PROGRESS NOTES
Delivery Note    Patient C/C/+2, pushed over intact perineum. LBMI  Delayed cord clamping  Spontaneous placenta delivery. Two pieces of membrane gently teased out. Sweeping of uterine cavity revealed no residual placental tissue/membranes. Placenta evaluated with velamentous cord insertion. Laceration repaired: 2nd degree in layers    Counts correct  17cc 1% lidocaine injected at site of perineal repair with fair response.     EBL 200cc

## 2020-07-09 NOTE — LACTATION NOTE
This note was copied from a baby's chart. Mother and father attempting to wake baby to feed. Baby not responding to attempts. Taught mother how to hand express drops to baby. BF basics info reviewed. Emphasized importance of frequent feeds in some manner. Discussed with mother her plan for feeding. Reviewed the benefits of exclusive breast milk feeding during the hospital stay. Informed her of the risks of using formula to supplement in the first few days of life as well as the benefits of successful breast milk feeding; referred her to the Breastfeeding booklet about this information. She acknowledges understanding of information reviewed and states that it is her plan to breastfeed her infant. Will support her choice and offer additional information as needed. Reviewed breastfeeding basics:  How milk is made and normal  breastfeeding behaviors discussed. Supply and demand,  stomach size, early feeding cues, skin to skin bonding with comfortable positioning and baby led latch-on reviewed. How to identify signs of successful breastfeeding sessions reviewed; education on assymetrical latch, signs of effective latching vs shallow, in-effective latching, normal  feeding frequency and duration and expected infant output discussed. Normal course of breastfeeding discussed including the AAP's recommendation that children receive exclusive breast milk feedings for the first six months of life with breast milk feedings to continue through the first year of life and/or beyond as complimentary table foods are added. Breastfeeding Booklet and Warm line information provided with discussion. Discussed typical  weight loss and the importance of pediatrician appointment within 24-48 hours of discharge, at 2 weeks of life and normalcy of requesting pediatric weight checks as needed in between visits    . Hand Expression Education:  Mom taught how to manually hand express her colostrum. Emphasized the importance of providing infant with valuable colostrum as infant rests skin to skin at breast.  Aware to avoid extended periods of non-feeding. Aware to offer 10-20+ drops of colostrum every 2-3 hours until infant is latching and nursing effectively. Taught the rationale behind this low tech but highly effective evidence based practice. Pt will successfully establish breastfeeding by feeding in response to early feeding cues or wake every 3h, will obtain deep latch, and will keep log of feedings/output. Taught to BF at hunger cues and or q 2-3 hrs and to offer 10-20 drops of hand expressed colostrum at any non-feeds. Breast Assessment  Left Breast: Large  Left Nipple: Everted, Intact  Right Breast: Large  Right Nipple: Everted, Intact  Breast- Feeding Assessment  Attends Breast-Feeding Classes: No  Breast-Feeding Experience: Yes  Breast Trauma/Surgery: No  Type/Quality: Good  Lactation Consultant Visits  Breast-Feedings: Good   Mother/Infant Observation  Mother Observation: Breast comfortable, Recognizes feeding cues  Infant Observation: Feeding cues  LATCH Documentation  Latch:  Too sleepy or reluctant, no latch achieved  Audible Swallowing: None  Type of Nipple: Everted (after stimulation)  Comfort (Breast/Nipple): Soft/non-tender  Hold (Positioning): No assist from staff, mother able to position/hold infant  LATCH Score: 6

## 2020-07-09 NOTE — ROUTINE PROCESS
Bedside and Verbal shift change report given to [address] [place] [address] (oncoming nurse) by Yogesh Rios (offgoing nurse). Report included the following information SBAR, Procedure Summary, Intake/Output, MAR, Accordion, Recent Results and Med Rec Status.

## 2020-07-09 NOTE — DISCHARGE SUMMARY
Obstetrical Discharge Summary     Name: Abbie Mason MRN: [id number]  SSN: [id number]    YOB: 1995  Age: 25 y.o. Sex: female      Admit Date: 2020    Discharge Date: 7/10/20     Admitting Physician: Yeison Philippe MD     Attending Physician:  Mary Beth Sevilla MD     Admission Diagnoses: Pregnancy [Z34.90]    Condition on Discharge: Stable    Procedures: IOL,     Disposition: to home    Follow up: No orders of the defined types were placed in this encounter. Discharge Diagnoses:   Information for the patient's :  Dell Badillo Male Stephanie [[id number]]   Delivery of a 8 lb 12.9 oz (3.995 kg) male infant via Vaginal, Spontaneous on 2020 at 6:47 PM  by Noble Cevallos. Apgars were 9  and 9 . Additional Diagnoses:   Hospital Problems  Date Reviewed: 2020          Codes Class Noted POA    Pregnancy ICD-10-CM: Z34.90  ICD-9-CM: V22.2  2020 Unknown             Lab Results   Component Value Date/Time    Rubella, External Immune 2019    GrBStrep, External Postive 2020       Hospital Course: During labor an irregular heart beat was noted. An EKG showed NSR with PVCs. IOL was performed with cervical morelos, AROM and pitocin. Normal hospital course following the delivery. Patient Instructions:   Current Discharge Medication List      CONTINUE these medications which have NOT CHANGED    Details   ferrous sulfate (Iron) 325 mg (65 mg iron) tablet Take 325 mg by mouth Daily (before breakfast). omeprazole (PRILOSEC) 20 mg capsule Take 20 mg by mouth daily. PNV JN.84/[id number] fum/folic ac (PRENATAL PO) Take  by mouth. Reference my discharge instructions.       Signed By:  Yeison Philippe MD     2020

## 2020-07-09 NOTE — ROUTINE PROCESS
Bedside shift change report given to Ana Paula Sandoval RN (oncoming nurse) by Carlene Pruitt RN (offgoing nurse). Report included the following information SBAR, Kardex, MAR and Recent Results.

## 2020-07-10 VITALS
OXYGEN SATURATION: 94 % | HEART RATE: 72 BPM | HEIGHT: 65 IN | RESPIRATION RATE: 16 BRPM | DIASTOLIC BLOOD PRESSURE: 64 MMHG | SYSTOLIC BLOOD PRESSURE: 121 MMHG | WEIGHT: 203 LBS | TEMPERATURE: 97.7 F | BODY MASS INDEX: 33.82 KG/M2

## 2020-07-10 PROCEDURE — 74011250637 HC RX REV CODE- 250/637: Performed by: OBSTETRICS & GYNECOLOGY

## 2020-07-10 RX ADMIN — IBUPROFEN 800 MG: 800 TABLET ORAL at 04:39

## 2020-07-10 RX ADMIN — HYDROCODONE BITARTRATE AND ACETAMINOPHEN 1 TABLET: 5; 325 TABLET ORAL at 00:44

## 2020-07-10 NOTE — PROGRESS NOTES
Post-Partum Progress Note    Patient doing well post-partum without significant complaint. She is voiding without difficulty, she reports normal lochia. Her pain is well controlled with oral pain medication. She is tolerating a general diet. Delivery information:  Information for the patient's :  Nel Francis [place] [[id number]]   Delivery of a 8 lb 12.9 oz (3.995 kg) male infant via Vaginal, Spontaneous on 2020 at 6:47 PM  by [employer] Earlene. Apgars were 9  and 9 . Vitals:    Patient Vitals for the past 8 hrs:   BP Temp Pulse Resp   20 1527 117/66 98 °F (36.7 °C) 80 16     Temp (24hrs), Av.1 °F (36.7 °C), Min:98 °F (36.7 °C), Max:98.4 °F (36.9 °C)    Visit Vitals  /66   Pulse 80   Temp 98 °F (36.7 °C)   Resp 16   Ht 5' 5\" (1.651 m)   Wt 203 lb (92.1 kg)   SpO2 94%   Breastfeeding Unknown   BMI 33.78 kg/m²       Exam:    General: Patient without distress. Abdomen: soft, fundus firm at level of umbilicus, nontender  Lower extremities: negative for cords or tenderness. Labs: No results found for this or any previous visit (from the past 24 hour(s)). Assessment:    1. Postpartum S/P spontaneous vaginal delivery   2. Patient doing well without significant complications  3. Predelivery anemia    Plan:  1. Continue routine postpartum care  2. Routine perineal care and maternal education   3. Oral pain medications and bowel regimen as needed              4. circ consent reviewed  5.  Pp anemia  Navid Medina MD    Late entry from 12pm rounding    WBC   Date Value Ref Range Status   2020 9.4 3.6 - 11.0 K/uL Final     RBC   Date Value Ref Range Status   2020 3.59 (L) 3.80 - 5.20 M/uL Final     HGB   Date Value Ref Range Status   2020 9.0 (L) 11.5 - 16.0 g/dL Final     HCT   Date Value Ref Range Status   2020 29.0 (L) 35.0 - 47.0 % Final     PLATELET   Date Value Ref Range Status   2020 174 150 - 400 K/uL Final     Hgb, External   Date Value Ref Range Status   04/23/2020 9.4  Final     Hct, External   Date Value Ref Range Status   04/23/2020 30.7  Final     Platelet cnt., External   Date Value Ref Range Status   04/23/2020 170  Final

## 2020-07-10 NOTE — LACTATION NOTE
This note was copied from a baby's chart. Discussed with mother her plan for feeding. Reviewed the benefits of exclusive breast milk feeding during the hospital stay. Informed her of the risks of using formula to supplement in the first few days of life as well as the benefits of successful breast milk feeding; referred her to the Breastfeeding booklet about this information. She acknowledges understanding of information reviewed and states that it is her plan to breastfeed her infant. Will support her choice and offer additional information as needed. Reviewed breastfeeding basics:  How milk is made and normal  breastfeeding behaviors discussed. Supply and demand,  stomach size, early feeding cues, skin to skin bonding with comfortable positioning and baby led latch-on reviewed. How to identify signs of successful breastfeeding sessions reviewed; education on assymetrical latch, signs of effective latching vs shallow, in-effective latching, normal  feeding frequency and duration and expected infant output discussed. Normal course of breastfeeding discussed including the AAP's recommendation that children receive exclusive breast milk feedings for the first six months of life with breast milk feedings to continue through the first year of life and/or beyond as complimentary table foods are added. Breastfeeding Booklet and Warm line information provided with discussion. Discussed typical  weight loss and the importance of pediatrician appointment within 24-48 hours of discharge, at 2 weeks of life and normalcy of requesting pediatric weight checks as needed in between visits. Pt will successfully establish breastfeeding by feeding in response to early feeding cues   or wake every 3h, will obtain deep latch, and will keep log of feedings/output. Taught to BF at hunger cues and or q 2-3 hrs with the goal of 8-12 feeds per 24 hours.   Breast Assessment  Left Breast: Large  Left Nipple: Everted, Intact  Right Breast: Large  Right Nipple: Everted, Intact  Breast- Feeding Assessment  Attends Breast-Feeding Classes: No  Breast-Feeding Experience: Yes(3 months with last child, had a allergy had to stop)  Breast Trauma/Surgery: No  Type/Quality: Good(per mother, not feeding during [facility] visit)  Lactation Consultant Visits  Breast-Feedings: Not breast-feeding  Mother/Infant Observation  Mother Observation: Breast comfortable, Recognizes feeding cues  Infant Observation: Feeding cues  LATCH Documentation  Latch: (did not see baby at breast)  Audible Swallowing: (did not see baby at breast)  Type of Nipple: Everted (after stimulation)  Comfort (Breast/Nipple): Soft/non-tender(per mother)  Hold (Positioning): No assist from staff, mother able to position/hold infant  LATCH Score: 9 (last latch score, LC did not see baby at breast)  Chart shows numerous feedings, void, stool WNL. Discussed importance of monitoring outputs and feedings on first week of life. Discussed ways to tell if baby is  getting enough breast milk, ie  voids and stools, change in color of stool, and return to birth wt within 2 weeks. Follow up with pediatrician visit for weight check in 1-2 days (per AAP guidelines.)  Encouraged to call Warm Line  [phone]  for any questions/problems that arise. Mother also given breastfeeding support group dates and times for any future needs.

## 2020-07-10 NOTE — PROGRESS NOTES
Post-Partum Day Number 2 Progress Note    Kailyn Pandya     Information for the patient's :  Iwona Omalley, Male Mesha Shan [[id number]]   Vaginal, Spontaneous    Patient doing well without significant complaint. Voiding without difficulty, normal lochia. Vitals:  Visit Vitals  /64 (BP 1 Location: Right arm, BP Patient Position: Sitting; At rest)   Pulse 72   Temp 97.7 °F (36.5 °C)   Resp 16   Ht 5' 5\" (1.651 m)   Wt 203 lb (92.1 kg)   LMP 10/01/2019 (Exact Date)   SpO2 94%   Breastfeeding Unknown   BMI 33.78 kg/m²     Temp (24hrs), Av.8 °F (36.6 °C), Min:97.7 °F (36.5 °C), Max:98 °F (36.7 °C)      Exam:         Patient without distress. Abdomen soft, fundus firm, nontender                 ower extremities are negative for swelling, cords or tenderness.     Labs:     Lab Results   Component Value Date/Time    WBC 9.4 2020 03:57 PM    WBC 11.8 (H) 2020 11:53 AM    WBC 8.6 2019 02:38 PM    WBC 7.6 2018 02:53 AM    WBC 16.6 (H) 2016 10:42 AM    WBC 12.3 (H) 2014 03:05 PM    WBC 12.0 (H) 07/10/2014 02:25 PM    WBC 9.1 2014 11:18 AM    WBC 10.4 2014 09:57 AM    WBC 10.6 2013 12:22 PM    WBC 25.6 (H) 2012 12:55 PM    HGB 9.0 (L) 2020 03:57 PM    HGB 9.4 (L) 2020 11:53 AM    HGB 12.2 2019 02:38 PM    HGB 14.8 2018 02:53 AM    HGB 14.1 2016 10:42 AM    HGB 6.6 (L) 2014 03:05 PM    HGB 8.8 (L) 07/10/2014 02:25 PM    HGB 8.6 (L) 2014 11:18 AM    HGB 8.6 (L) 2014 09:57 AM    HGB 12.8 2013 12:22 PM    HGB 14.2 (H) 2012 12:55 PM    HCT 29.0 (L) 2020 03:57 PM    HCT 30.7 (L) 2020 11:53 AM    HCT 35.7 2019 02:38 PM    HCT 43.0 2018 02:53 AM    HCT 43.5 2016 10:42 AM    HCT 21.3 (L) 2014 03:05 PM    HCT 28.7 (L) 07/10/2014 02:25 PM    HCT 28.1 (L) 2014 11:18 AM    HCT 26.3 (L) 2014 09:57 AM    HCT 39.5 2013 12:22 PM    HCT 39.2 2012 12:55 PM    PLATELET 661 76/68/1293 03:57 PM    PLATELET 857 03/15/1323 11:53 AM    PLATELET 019 77/02/6387 02:38 PM    PLATELET 288 08/92/2460 02:53 AM    PLATELET 468 86/01/4152 10:42 AM    PLATELET 647 07/51/6574 03:05 PM    PLATELET 203 04/99/4899 02:25 PM    PLATELET 921 73/22/6234 11:18 AM    PLATELET 378 60/33/2405 09:57 AM    PLATELET 376 24/91/1399 12:22 PM    PLATELET 502 61/74/5571 12:55 PM    Hgb, External 9.4 04/23/2020    Hgb, External 12.2 12/23/2019    Hgb, External 8.6 04/11/2014    Hgb, External 12.8 11/27/2013    Hgb, External 12.8 11/27/2013    Hct, External 30.7 04/23/2020    Hct, External 35.7 12/23/2019    Hct, External 26.3 04/11/2014    Hct, External 39.5 11/27/2013    Hct, External 39.5 11/27/2013    Platelet cnt., External 170 04/23/2020    Platelet cnt., External 269 12/23/2019    Platelet cnt., External 225 04/11/2014    Platelet cnt., External 285 11/27/2013    Platelet cnt., External 285 11/27/2013       No results found for this or any previous visit (from the past 24 hour(s)). Assessment: Doing well, post partum day 2    Plan:   1. Discharge home today  2. Follow up in office in 6 weeks with Scott Monroe MD  3. Post partum activity advised, diet as tolerated  4.  Discharge Medications: ibuprofen, percocet and medications prior to admission

## 2020-07-10 NOTE — PROGRESS NOTES
Pt was given discharge instructions and verbalized understanding. Pt states that she will make a 6 week follow up . Pt states that she knows when to call the doctor.  Pt was discharged in stable condition,

## 2020-07-10 NOTE — DISCHARGE INSTRUCTIONS
[facility]  [email or web address]  [phone]    Scott Ramos MD, FACOG     POST DELIVERY DISCHARGE INSTRUCTIONS  FROM YOUR PHYSICIAN    Name: Aminah Torres  YOB: 1995    General:     Read all discharge information provided by the hospital    Diet/Diet Restrictions:  Eat healthy meals and snacks as desired. Eat foods that are high in fiber and low in fat and cholesterol. Drink eight 8-ounce glasses of water daily; avoid excessive caffeine intake. [email or web address]    Medications:   See discharge medication list and read instructions carefully. Breast Feeding:  See instructions from your lactation consult. Call [phone] for more information or to locate a lactation consultant. [email or web address]    Vaccines:  If you received the MMR vaccine postpartum you should wait three months until you get pregnant again. You, and close contacts, should make sure that the Tdap vaccine is up to date. This vaccine can decrease the risk of your baby getting pertussis or \"whooping cough. \"    You, and close contacts, should receive the influenza vaccine during flu season when appropriate. [email or web address]    Tobacco Use: If you (or other people around the baby) smoke or use tobacco products, please try to  use and quit to improve your health and decrease risk to your baby. [email or web address]    Swelling in your Legs:  There are many fluid changes after delivery and you may have more swelling the first few days after delivery  Continue to drink plenty of water, avoid sitting or standing in one position for too long and elevate your feet above your heart, to help reduce some the pressure you may be feeling in your ankles and legs.     Anemia:  Your results show some anemia, or low blood count. You should start, or add, an over the counter elemental iron supplement of 45-65 mg. Consider 'Slow FE' or ferrous sulfate 325 mg once a day for at least three months. Also take vitamin C 250 mg and a daily prenatal vitamin to help anemia. Add a stool softener, like docusate or colace 100 mg (2x/day) to avoid constipation. If you do not tolerate supplements you can try blackstrap molasses (1 tbsp=27mg elemental iron). Irregular heart rate:  Please follow up with your PCP in ~ 3 months for the irregular heart rate. It may resolve after delivery and with improvement of your anemia, but you should follow up with your PCP so they can continue to monitor it. Physical Activity / Restrictions / Safety:     Avoid heavy lifting, no more that 10 pounds, for 2-3 weeks. No driving while taking narcotic pain medication, of if you can not slam on the brakes. No intercourse for 4-6 weeks, no douching or tampon use until seen by your doctor for your postpartum visit. Use condoms as needed for contraception with sexual activity. You may resume normal exercise after you are cleared by your physician at your postpartum check. You may walk for exercise, as tolerated. Discharge Instructions/Special Treatment/Home Care Needs:     Continue your prenatal vitamins while breast feeding or pumping. Continue to use a squirt bottle with warm water on your perineum/bottom/episiotomy after each bathroom use until bleeding stops. Take stool softeners daily. For example, docusate over the counter stool softener. This is especially important if you are taking narcotic pain medications, because they can cause constipation. Call your doctor for the following: If you have a fever over 100.4 degrees by mouth on two readings.    If you have persistent vaginal bleeding heavier than a heavy menstrual period or persistent large clots or if you are bleeding so heavy it is making you feel weak. It is normal to pass larger clots when you first get out of bed: but if they persist, notify your physician. If you have red streaks or increased swelling of legs, painful red streaks on your breast.  If you have painful urination, or increased pain, redness or discharge with your incision. If you have any questions or concerns. Pain Management:     Take Acetaminophen (Tylenol), Ibuprofen (Advil, Motrin), prescribed pain medications as directed for pain. Do not take Perocet with Tylenol, they both contain acetaminophen. Use a warm water Sitz bath 3 times daily to relieve episiotomy, bottom/perineum or hemorrhoidal discomfort. Apply heating pad to  incision as needed. For hemorrhoidal discomfort, you can use Tucks and Anusol cream as needed and directed. NSAID information for patients:  Kady    Pain medication/narcotic information for patients:   Take your medicine exactly as prescribed   Store your medicine away from children and in a safe  place   Do not give your medicine to others   Do not drink alcohol while taking this medicine    Follow-Up Care:     Appointment with MD:  Dr. Amrita Dodson  [phone]  Schedule your postpartum visit for six weeks (can be done virtually)        Additional Discharge Instructions    Please read all of your discharge instructions  Follow all of your medication instructions carefully  Call our office on the next business day to schedule your follow-up appointment  If you have any questions or concerns, please contact us at [phone] or if the situation is urgent contact   Become a [address] My Chart user so you can access information, results and appointments: go to [email or web address].      [address],[place] [address] [address] is to bring compassion to healthcare and to be good help to those in need. We aim at providing quality healthcare with an emphasis on respect, justice, compassion, stewardship, integrity, growth and innovation. If you did not receive excellent communication, compassionate care and an outstanding patient experience, please notify Lian Herrera at [email or web address] or [phone] or discuss your concerns with me at your next visit so that we can meet our mission and your expectations    Lizet Ortega MD  [address] [place] [address], [address]  [email or web address]   [phone]   Good Help to Those in [address] [place] [address]       Name:  Raven Guadalupe  YOB: 1995  Admission Diagnosis:  Pregnancy [Z34.90]     Discharge Diagnosis:    Problem List as of 7/10/2020 Date Reviewed: 7/7/2020          Codes Class Noted - Resolved    Pregnancy ICD-10-CM: Z34.90  ICD-9-CM: V22.2  7/7/2020 - Present        Prenatal care of multigravida, antepartum ICD-10-CM: Z34.80  ICD-9-CM: V22.1  7/10/2014 - Present    Overview Addendum 7/2/2020 11:31 AM by Kevin Hanks MD     Needs colpo at 16w pt deferred, declined at 28 wks wants to do pp  Distant h/o CT--rx'd  hgsil pap: colpo 1/30/2020  Repeat colpo w/ glucola   NIPS xy  ? Marginal cord insertion: fu us: check face/outfloor/ CI, wnl, fu 28 w wnl  Travel prec [place], [place]  Tdap given 04/23/2020  HGB: 9.4, HCT: 30.7, 04/23/2020  Induction 7/8/20. Levi 7/7. Please notify patient. Moving to [facility], delivering here. GBS: Positive - 06/18/2020  SARS-CoV-2, Covid-19: Negative - 06/25/20  Dana Shah nipple ointment pp.               Anemia ICD-10-CM: D64.9  ICD-9-CM: 285.9  6/17/2014 - Present        Back pain complicating pregnancy DPE-89-AR: O99.89, M54.9  ICD-9-CM: 646.80, 724.5  5/19/2014 - Present        Pyelonephritis, unspecified ICD-10-CM: N12  ICD-9-CM: 590.80  6/24/2012 - Present        Nausea with vomiting ICD-10-CM: R11.2  ICD-9-CM: 787.01  6/24/2012 - Present        RESOLVED: Supervision of normal first pregnancy ICD-10-CM: Z34.00  ICD-9-CM: V22.0  11/27/2013 - 12/23/2019    Overview Addendum 12/23/2019  2:16 PM by Mark Falcon MD     Severe back pain-musculo-skeletal  H/O gallstones  Possible uterine myoma-posterior 3 cm at 8 weeks  Chlamydia-rx'd years ago  Anemia--8.6 at 29                 Attending Physician:  Bianka Grady MD    Delivery Type:  Vaginal Childbirth with Episiotomy, Laceration or Tear: What To Expect At [address] [place] [address] Drive body will slowly heal in the next few weeks. It is easy to get too tired and overwhelmed during the first weeks after your baby is born. Changes in your hormones can shift your mood without warning. You may find it hard to meet the extra demands on your energy and time. Take it easy on yourself. Follow-up care is a key part of your treatment and safety. Be sure to make and go to all appointments, and call your doctor if you are having problems. It's also a good idea to know your test results and keep a list of the medicines you take. How can you care for yourself at home? Vaginal Bleeding and Cramps  · After delivery, you will have a bloody discharge from the vagina. This will turn pink within a week and then white or yellow after about 10 days. It may last for 2 to 4 weeks or longer, until the uterus has healed. Use pads instead of tampons until you stop bleeding. · Do not worry if you pass some blood clots, as long as they are smaller than a golf ball. If you have a tear or stitches in your vaginal area, change the pad at least every 4 hours to prevent soreness and infection. · You may have cramps for the first few days after childbirth. These are normal and occur as the uterus shrinks to normal size.  Take an over-the-counter pain medicine, such as acetaminophen (Tylenol), ibuprofen (Advil, Motrin), or naproxen (Padmaja), for cramps. Read and follow all instructions on the label. Do not take aspirin, because it can cause more bleeding. Do not take acetaminophen (Tylenol) and other acetaminophen containing medications (i.e. Percocet) at the same time. Episiotomy, Lacerations or Tears  · If you have stitches, they will dissolve on their own and do not need to be removed. · Put ice or a cold pack on your painful area for 10 to 20 minutes at a time, several times a day, for the first few days. Put a thin cloth between the ice and your skin. · Sit in a few inches of warm water (sitz bath) 3 times a day and after bowel movements. The warm water helps with pain and itching. If you do not have a tub, a warm shower might help. Breast fullness  · Your breasts may overfill (engorge) in the first few days after delivery. To help milk flow and to relieve pain, warm your breasts in the shower or by using warm, moist towels before nursing. · If you are not nursing, do not put warmth on your breasts or touch your breasts. Wear a tight bra or sports bra and use ice until the fullness goes away. This usually takes 2 to 3 days. · Put ice or a cold pack on your breast after nursing to reduce swelling and pain. Put a thin cloth between the ice and your skin. Activity  · Eat a balanced diet. Do not try to lose weight by cutting calories. Keep taking your prenatal vitamins, or take a multivitamin. · Get as much rest as you can. Try to take naps when your baby sleeps during the day. · Get some exercise every day. But do not do any heavy exercise until your doctor says it is okay. · Wait until you are healed (about 4 to 6 weeks) before you have sexual intercourse. Your doctor will tell you when it is okay to have sex. · Talk to your doctor about birth control. You can get pregnant even before your period returns. Also, you can get pregnant while you are breast-feeding.     Mental Health  · Many women get the \"baby blues\" during the first few days after childbirth. You may lose sleep, feel irritable, and cry easily. You may feel happy one minute and sad the next. Hormone changes are one cause of these emotional changes. Also, the demands of a new baby, along with visits from relatives or other family needs, add to a mother's stress. The \"baby blues\" often peak around the fourth day. Then they ease up in less than 2 weeks. · If your moodiness or anxiety lasts for more than 2 weeks, or if you feel like life is not worth living, you may have postpartum depression. This is different for each mother. Some mothers with serious depression may worry intensely about their infant's well-being. Others may feel distant from their child. Some mothers might even feel that they might harm their baby. A mother may have signs of paranoia, wondering if someone is watching her. · With all the changes in your life, you may not know if you are depressed. Pregnancy sometimes causes changes in how you feel that are similar to the symptoms of depression. · Symptoms of depression include:  · Feeling sad or hopeless and losing interest in daily activities. These are the most common symptoms of depression. · Sleeping too much or not enough. · Feeling tired. You may feel as if you have no energy. · Eating too much or too little. · POSTPARTUM SUPPORT [employer] (PSI) offers a Warm line; Chat with the Expert phone sessions; Information and Articles about Pregnancy and Postpartum Mood Disorders; Comprehensive List of Free Support Groups; Knowledgeable local coordinators who will offer support, information, and resources; Guide to Resources on mySBX; Calendar of events in the  mood disorders community; Latest News and Research; and [facility] [address] for [employer]. Remember - You are not alone; You are not to blame; With help, you will be well. 9-489-527-PPD(3714). WWW. POSTPARTUM. NET    · Writing or talking about death, such as writing suicide notes or talking about guns, knives, or pills. Keep the numbers for these national suicide hotlines: 1-411-039-TALK (7-172.433.7680) and 0-294-AFGSGRG (6-660.680.3739). If you or someone you know talks about suicide or feeling hopeless, get help right away. Constipation and Hemorrhoids  Drink plenty of fluids, enough so that your urine is light yellow or clear like water. If you have kidney, heart, or liver disease and have to limit fluids, talk with your doctor before you increase the amount of fluids you drink. · Eat plenty of fiber each day. Have a bran muffin or bran cereal for breakfast, and try eating a piece of fruit for a mid-afternoon snack. · For painful, itchy hemorrhoids, put ice or a cold pack on the area several times a day for 10 minutes at a time. Follow this by putting a warm compress on the area for another 10 to 20 minutes or by sitting in a shallow, warm bath. When should you call for help? Call 911 anytime you think you may need emergency care. For example, call if:  · You are thinking of hurting yourself, your baby, or anyone else. · You passed out (lost consciousness). · You have symptoms of a blood clot in your lung (called a pulmonary embolism). These may include:  · Sudden chest pain. · Trouble breathing. · Coughing up blood. Call your doctor now or seek immediate medical care if:  · You have severe vaginal bleeding. · You are soaking through a pad each hour for 2 or more hours. · Your vaginal bleeding seems to be getting heavier or is still bright red 4 days after delivery. · You are dizzy or lightheaded, or you feel like you may faint. · You are vomiting or cannot keep fluids down. · You have a fever. · You have new or more belly pain. · You pass tissue (not just blood). · Your vaginal discharge smells bad. · Your belly feels tender or full and hard. · Your breasts are continuously painful or red.   · You feel sad, anxious, or hopeless for more than a few days.  · You have sudden, severe pain in your belly. · You have symptoms of a blood clot in your leg (called a deep vein thrombosis), such as:  · Pain in your calf, back of the knee, thigh, or groin. · Redness and swelling in your leg or groin. · You have symptoms of preeclampsia, such as:  · Sudden swelling of your face, hands, or feet. · New vision problems (such as dimness or blurring). · A severe headache. · Your blood pressure is higher than it should be or rises suddenly. · You have new nausea or vomiting. Watch closely for changes in your health, and be sure to contact your doctor if you have any problems. Additional Information:  Not applicable    These are general instructions for a healthy lifestyle:    No smoking/ No tobacco products/ Avoid exposure to second hand smoke    Surgeon General's Warning:  Quitting smoking now greatly reduces serious risk to your health. Obesity, smoking, and sedentary lifestyle greatly increases your risk for illness    A healthy diet, regular physical exercise & weight monitoring are important for maintaining a healthy lifestyle    Recognize signs and symptoms of STROKE:    F-face looks uneven    A-arms unable to move or move unevenly    S-speech slurred or non-existent    T-time-call 911 as soon as signs and symptoms begin - DO NOT go       back to bed or wait to see if you get better - TIME IS BRAIN. I have had the opportunity to make my options or choices for discharge. I have received and understand these instructions.

## 2020-07-15 ENCOUNTER — TELEPHONE (OUTPATIENT)
Dept: OBGYN CLINIC | Age: 25
End: 2020-07-15

## 2020-07-15 NOTE — TELEPHONE ENCOUNTER
Call received at 11:05am      25year old patient delivered on 2020 by teena Martinez from [facility] calling to request information regarding the patient.   This nurse advised and provided the office fax number to be able to send a faxed request.

## 2020-07-21 ENCOUNTER — TELEPHONE (OUTPATIENT)
Dept: OBGYN CLINIC | Age: 25
End: 2020-07-21

## 2020-07-21 NOTE — TELEPHONE ENCOUNTER
Patient calling this afternoon. Last seen 07/07/2020. Patient calling for paper work to see if we receive for her work and when it would be ready. Please advise. Thank you!

## 2020-08-26 ENCOUNTER — TELEPHONE (OUTPATIENT)
Dept: OBGYN CLINIC | Age: 25
End: 2020-08-26

## 2020-08-26 NOTE — TELEPHONE ENCOUNTER
Patient of TP    Patient is having trouble getting PP visit. She would like to see if you can work her in anytime on Wednesday 9/9/20. She is coming from out of town and will be here that day and have a sitter for her kids. She is doing well but wants to be checked from her vaginal birth on 7/7/20      Please advise. She said she has Mychart as well, but I will wait to get message back from you to schedule her.

## 2020-09-09 ENCOUNTER — OFFICE VISIT (OUTPATIENT)
Dept: OBGYN CLINIC | Age: 25
End: 2020-09-09
Payer: COMMERCIAL

## 2020-09-09 PROCEDURE — 0503F POSTPARTUM CARE VISIT: CPT | Performed by: OBSTETRICS & GYNECOLOGY

## 2020-09-09 RX ORDER — ACETAMINOPHEN AND CODEINE PHOSPHATE 120; 12 MG/5ML; MG/5ML
1 SOLUTION ORAL DAILY
Qty: 3 PACKAGE | Refills: 0 | Status: SHIPPED | OUTPATIENT
Start: 2020-09-09 | End: 2020-12-07 | Stop reason: SDUPTHER

## 2020-09-09 NOTE — PATIENT INSTRUCTIONS
After Your Delivery (the Postpartum Period): Care Instructions Your Care Instructions Congratulations on the birth of your baby. Like pregnancy, the  period can be a time of excitement, marina, and exhaustion. You may look at your wondrous little baby and feel happy. You may also be overwhelmed by your new sleep hours and new responsibilities. At first, babies often sleep during the days and are awake at night. They do not have a pattern or routine. They may make sudden gasps, jerk themselves awake, or look like they have crossed eyes. These are all normal, and they may even make you smile. In these first weeks after delivery, try to take good care of yourself. It may take 4 to 6 weeks to feel like yourself again, and possibly longer if you had a  birth. You will likely feel very tired for several weeks. Your days will be full of ups and downs, but lots of marina as well. Follow-up care is a key part of your treatment and safety. Be sure to make and go to all appointments, and call your doctor if you are having problems. It's also a good idea to know your test results and keep a list of the medicines you take. How can you care for yourself at home? Take care of your body after delivery · Use pads instead of tampons for the bloody flow that may last as long as 2 weeks. · Ease cramps with ibuprofen (Advil, Motrin). · Ease soreness of hemorrhoids and the area between your vagina and rectum with ice compresses or witch hazel pads. · Ease constipation by drinking lots of fluid and eating high-fiber foods. Ask your doctor about over-the-counter stool softeners. · Cleanse yourself with a gentle squeeze of warm water from a bottle instead of wiping with toilet paper. · Take a sitz bath in warm water several times a day. · Wear a good nursing bra. Ease sore and swollen breasts with warm, wet washcloths. · If you are not breastfeeding, use ice rather than heat for breast soreness. · Your period may not start for several months if you are breastfeeding. You may bleed more, and longer at first, than you did before you got pregnant. · Wait until you are healed (about 4 to 6 weeks) before you have sexual intercourse. Your doctor will tell you when it is okay to have sex. · Try not to travel with your baby for 5 or 6 weeks. If you take a long car trip, make frequent stops to walk around and stretch. Avoid exhaustion · Rest every day. Try to nap when your baby naps. · Ask another adult to be with you for a few days after delivery. · Plan for  if you have other children. · Stay flexible so you can eat at odd hours and sleep when you need to. Both you and your baby are making new schedules. · Plan small trips to get out of the house. Change can make you feel less tired. · Ask for help with housework, cooking, and shopping. Remind yourself that your job is to care for your baby. Know about help for postpartum depression · \"Baby blues\" are common for the first 1 to 2 weeks after birth. You may cry or feel sad or irritable for no reason. · Rest whenever you can. Being tired makes it harder to handle your emotions. · Go for walks with your baby. · Talk to your partner, friends, and family about your feelings. · If your symptoms last for more than a few weeks, or if you feel very depressed, ask your doctor for help. · Postpartum depression can be treated. Support groups and counseling can help. Sometimes medicine can also help. Stay healthy · Eat healthy foods so you have more energy and lose extra baby pounds. · If you breastfeed, avoid drugs. If you quit smoking during pregnancy, try to stay smoke-free. If you choose to have a drink now and then, have only one drink, and limit the number of occasions that you have a drink. Wait to breastfeed at least 2 hours after you have a drink to reduce the amount of alcohol the baby may get in the milk. · Start daily exercise after 4 to 6 weeks, but rest when you feel tired. · Learn exercises to tone your belly. Do Kegel exercises to regain strength in your pelvic muscles. You can do these exercises while you stand or sit. ? Squeeze the same muscles you would use to stop your urine. Your belly and thighs should not move. ? Hold the squeeze for 3 seconds, and then relax for 3 seconds. ? Start with 3 seconds. Then add 1 second each week until you are able to squeeze for 10 seconds. ? Repeat the exercise 10 to 15 times for each session. Do three or more sessions each day. · Find a class for new mothers and new babies that has an exercise time. · If you had a  birth, give yourself a bit more time before you exercise, and be careful. When should you call for help? Call  911 anytime you think you may need emergency care. For example, call if: 
  · You have thoughts of harming yourself, your baby, or another person.  
  · You passed out (lost consciousness).  
  · You have chest pain, are short of breath, or cough up blood.  
  · You have a seizure. Call your doctor now or seek immediate medical care if: 
  · You have severe vaginal bleeding. This means you are passing blood clots and soaking through a pad each hour for 2 or more hours.  
  · You are dizzy or lightheaded, or you feel like you may faint.  
  · You have a fever.  
  · You have new or more belly pain.  
  · You have signs of a blood clot in your leg (called a deep vein thrombosis), such as: 
? Pain in the calf, back of the knee, thigh, or groin. ? Redness and swelling in your leg or groin.  
  · You have signs of preeclampsia, such as: 
? Sudden swelling of your face, hands, or feet. ? New vision problems (such as dimness, blurring, or seeing spots). ? A severe headache. Watch closely for changes in your health, and be sure to contact your doctor if: 
  · Your vaginal bleeding seems to be getting heavier.   · You have new or worse vaginal discharge.  
  · You feel sad, anxious, or hopeless for more than a few days.  
  · You do not get better as expected. Where can you learn more? Go to http://ghada-farrukh.info/ Enter A461 in the search box to learn more about \"After Your Delivery (the Postpartum Period): Care Instructions. \" Current as of: February 11, 2020               Content Version: 12.6 © 2006-2020 [employer]. Care instructions adapted under license by [employer] (which disclaims liability or warranty for this information). If you have questions about a medical condition or this instruction, always ask your healthcare professional. [address] any warranty or liability for your use of this information.

## 2020-09-09 NOTE — PROGRESS NOTES
Savannah Tijerina MD, [facility]     Postpartum visit    Chief Complaint   Patient presents with   [address] is a 22 y.o. female G4  who presents for a postpartum exam.     She is now 6 weeks from a vaginal delivery delivery. Patient's last menstrual period was 08/29/2020 (exact date). She has had the following significant problems since her delivery: none. She reports her mood as Fair. The patient is breastfeeding/pumping breast milk for her baby. The patient would like to use OCP, mini pill  for birth control. She is due for her next AE in 3 months. Past Medical History:   Diagnosis Date    Anemia     HGSIL (high grade squamous intraepithelial lesion) on Pap smear of cervix 11/25/2019    Pyelonephritis, unspecified 6/24/2012    Routine Papanicolaou smear 9/1/16 neg    Scoliosis     scoliosis     Past Surgical History:   Procedure Laterality Date    HX CHOLECYSTECTOMY  04/2016    HX COLPOSCOPY  01/30/2020    HX LAP CHOLECYSTECTOMY  2015    HX OTHER SURGICAL  08/2016    Ureteroscopy     Current Outpatient Medications   Medication Sig    norethindrone (MICRONOR) 0.35 mg tab Take 1 Tab by mouth daily.  nipple ointment [facility] Formula) Apply  to affected area two (2) times a day.  ibuprofen (MOTRIN) 600 mg tablet Take 1 Tab by mouth every six (6) hours as needed for Pain. Take with food.  ferrous sulfate (Iron) 325 mg (65 mg iron) tablet Take 325 mg by mouth Daily (before breakfast).  omeprazole (PRILOSEC) 20 mg capsule Take 20 mg by mouth daily.  PNV CS.92/PNGTXAO fum/folic ac (PRENATAL PO) Take  by mouth. No current facility-administered medications for this visit.       No Known Allergies  Family History   Problem Relation Age of Onset    Asthma Mother     Diabetes Father      Social History     Socioeconomic History    Marital status: SINGLE     Spouse name: Not on file    Number of children: Not on file    Years of education: Not on file    Highest education level: Not on file   Occupational History    Not on file   Social Needs    Financial resource strain: Not on file    Food insecurity     Worry: Not on file     Inability: Not on file    Transportation needs     Medical: Not on file     Non-medical: Not on file   Tobacco Use    Smoking status: Former Smoker    Smokeless tobacco: Former User     Quit date: 2019   Substance and Sexual Activity    Alcohol use: Not Currently    Drug use: No    Sexual activity: Yes     Partners: Male     Birth control/protection: None   Lifestyle    Physical activity     Days per week: Not on file     Minutes per session: Not on file    Stress: Not on file   Relationships    Social connections     Talks on phone: Not on file     Gets together: Not on file     Attends [employer] service: Not on file     Active member of club or organization: Not on file     Attends meetings of clubs or organizations: Not on file     Relationship status: Not on file    Intimate partner violence     Fear of current or ex partner: Not on file     Emotionally abused: Not on file     Physically abused: Not on file     Forced sexual activity: Not on file   Other Topics Concern     Service Not Asked    Blood Transfusions Not Asked    Caffeine Concern Not Asked    Occupational Exposure Not Asked   Sherian [place] Hazards Not Asked    Sleep Concern Not Asked    Stress Concern Not Asked    Weight Concern Not Asked    Special Diet Not Asked    Back Care Not Asked    Exercise Not Asked    Bike Helmet Not Asked   [address] Not Asked    Self-Exams Not Asked   Social History Narrative    Not on file     OB History        4    Para   2    Term   2            AB   2    Living   2       SAB        TAB   2    Ectopic        Molar        Multiple   0    Live Births   2          Obstetric Comments    IOL             Immunization History   Administered Date(s) Administered   [address] [place] [address] Influenza Vaccine (Quad) PF 11/25/2019    Tdap 04/23/2020       Review of Systems:  History obtained from the patient  General ROS: negative for - chills, fever or weight loss  Respiratory ROS: no cough, shortness of breath, or wheezing  Cardiovascular ROS: no chest pain or dyspnea on exertion  Gastrointestinal ROS: negative for - appetite loss, change in bowel habits or nausea/vomiting  Genito-Urinary ROS: negative except for as noted in HPI    PHYSICAL EXAMINATION  Visit Vitals  Breastfeeding Yes       Constitutional  · Appearance: well-nourished, well developed, alert, in no acute distress    HENT  · Head and Face: appears normal    Neck  · Inspection/Palpation: normal appearance, no masses or tenderness  · Lymph Nodes: no lymphadenopathy present  · Thyroid: gland size normal, nontender, no nodules or masses present on palpation    Chest  clear to auscultation, no wheezes, rales or rhonchi, symmetric air entry. Cardiac/CVS  normal rate, regular rhythm, normal S1, S2, no murmurs, rubs, clicks or gallops.     Breasts  · Inspection of Breasts: breasts symmetrical, no skin changes, no discharge present, nipple appearance normal, no skin retraction present  · Palpation of Breasts and Axillae: no masses present on palpation, no breast tenderness  · Axillary Lymph Nodes: no lymphadenopathy present    Gastrointestinal  · Abdominal Examination: abdomen non-tender to palpation, normal bowel sounds, no masses present  · Liver and spleen: no hepatomegaly present, spleen not palpable  · Hernias: no hernias identified    Genitourinary  · External Genitalia: normal appearance for age, no discharge present, no tenderness present, no inflammatory lesions present, no masses present, no atrophy present  · Vagina: normal vaginal vault without central or paravaginal defects, no discharge present, no inflammatory lesions present, no masses present  · Bladder: non-tender to palpation  · Urethra: appears normal  · Cervix: normal   · Uterus: normal size, shape and consistency  · Adnexa: no adnexal tenderness present, no adnexal masses present  · Perineum: perineum within normal limits, no evidence of trauma, no rashes or skin lesions present  · Anus: anus within normal limits  · Inguinal Lymph Nodes: no lymphadenopathy present    Skin  · General Inspection: no rash, no lesions identified    Neurologic/Psychiatric  · Mental Status:  · Orientation: grossly oriented to person, place and time  · Mood and Affect: mood normal, affect appropriate    Assessment:  Normal postpartum check  Encounter Diagnosis   Name Primary?  Postpartum exam Yes   ho hgsil pap    Plan:  RTO for AE or sooner prn  Discussed contraception options; r/b/a. Planned contraception: micronor  Recommend back up with micronor/minipill with new start and if not exclusively breast feeding. Reviewed the importance of taking it at the same time each day. Disc mood: pt prefers observation and will notify MD if NI (lives in [place])  Disc support group   She should return to normal activity  We recommend healthy balanced diet, regular exercise  We discussed safer sex practices, condom use and risk factors for sexually transmitted diseases.    Patient should notify MD if she cannot resume normal activity and exercise  Recommended continuing prenatal vitamins/folic acid  rec pp colpo

## 2020-10-14 ENCOUNTER — TELEPHONE (OUTPATIENT)
Dept: OBGYN CLINIC | Age: 25
End: 2020-10-14

## 2020-10-15 ENCOUNTER — DOCUMENTATION ONLY (OUTPATIENT)
Dept: OBGYN CLINIC | Age: 25
End: 2020-10-15

## 2020-12-07 RX ORDER — ACETAMINOPHEN AND CODEINE PHOSPHATE 120; 12 MG/5ML; MG/5ML
1 SOLUTION ORAL DAILY
Qty: 1 PACKAGE | Refills: 0 | Status: SHIPPED | OUTPATIENT
Start: 2020-12-07